# Patient Record
Sex: FEMALE | Race: WHITE | URBAN - METROPOLITAN AREA
[De-identification: names, ages, dates, MRNs, and addresses within clinical notes are randomized per-mention and may not be internally consistent; named-entity substitution may affect disease eponyms.]

---

## 2017-09-28 ENCOUNTER — TRANSFERRED RECORDS (OUTPATIENT)
Dept: HEALTH INFORMATION MANAGEMENT | Facility: CLINIC | Age: 67
End: 2017-09-28

## 2017-09-29 ENCOUNTER — ANESTHESIA EVENT (OUTPATIENT)
Dept: SURGERY | Facility: AMBULATORY SURGERY CENTER | Age: 67
End: 2017-09-29

## 2017-09-29 RX ORDER — CALCIUM POLYCARBOPHIL 625 MG 625 MG/1
2 TABLET ORAL DAILY
COMMUNITY

## 2017-09-29 RX ORDER — POLYETHYLENE GLYCOL 3350 17 G/17G
1 POWDER, FOR SOLUTION ORAL PRN
COMMUNITY
End: 2017-10-24

## 2017-09-29 RX ORDER — MULTIVITAMIN WITH IRON
1 TABLET ORAL DAILY
COMMUNITY

## 2017-10-02 ENCOUNTER — SURGERY (OUTPATIENT)
Age: 67
End: 2017-10-02

## 2017-10-02 ENCOUNTER — HOSPITAL ENCOUNTER (OUTPATIENT)
Facility: AMBULATORY SURGERY CENTER | Age: 67
End: 2017-10-02
Attending: ORTHOPAEDIC SURGERY

## 2017-10-02 ENCOUNTER — ANESTHESIA (OUTPATIENT)
Dept: SURGERY | Facility: AMBULATORY SURGERY CENTER | Age: 67
End: 2017-10-02

## 2017-10-02 VITALS
TEMPERATURE: 97.6 F | WEIGHT: 170 LBS | SYSTOLIC BLOOD PRESSURE: 109 MMHG | BODY MASS INDEX: 27.32 KG/M2 | HEIGHT: 66 IN | RESPIRATION RATE: 15 BRPM | OXYGEN SATURATION: 99 % | DIASTOLIC BLOOD PRESSURE: 65 MMHG

## 2017-10-02 RX ORDER — PROPOFOL 10 MG/ML
INJECTION, EMULSION INTRAVENOUS PRN
Status: DISCONTINUED | OUTPATIENT
Start: 2017-10-02 | End: 2017-10-02

## 2017-10-02 RX ORDER — NALOXONE HYDROCHLORIDE 0.4 MG/ML
.1-.4 INJECTION, SOLUTION INTRAMUSCULAR; INTRAVENOUS; SUBCUTANEOUS
Status: DISCONTINUED | OUTPATIENT
Start: 2017-10-02 | End: 2017-10-03 | Stop reason: HOSPADM

## 2017-10-02 RX ORDER — FENTANYL CITRATE 50 UG/ML
25-50 INJECTION, SOLUTION INTRAMUSCULAR; INTRAVENOUS
Status: DISCONTINUED | OUTPATIENT
Start: 2017-10-02 | End: 2017-10-02 | Stop reason: HOSPADM

## 2017-10-02 RX ORDER — DEXAMETHASONE SODIUM PHOSPHATE 4 MG/ML
INJECTION, SOLUTION INTRA-ARTICULAR; INTRALESIONAL; INTRAMUSCULAR; INTRAVENOUS; SOFT TISSUE PRN
Status: DISCONTINUED | OUTPATIENT
Start: 2017-10-02 | End: 2017-10-02

## 2017-10-02 RX ORDER — FENTANYL CITRATE 50 UG/ML
INJECTION, SOLUTION INTRAMUSCULAR; INTRAVENOUS PRN
Status: DISCONTINUED | OUTPATIENT
Start: 2017-10-02 | End: 2017-10-02

## 2017-10-02 RX ORDER — OXYCODONE HYDROCHLORIDE 5 MG/1
5 TABLET ORAL EVERY 4 HOURS PRN
Qty: 20 TABLET | Refills: 0 | Status: SHIPPED | OUTPATIENT
Start: 2017-10-02 | End: 2017-10-24

## 2017-10-02 RX ORDER — ACETAMINOPHEN 325 MG/1
975 TABLET ORAL ONCE
Status: COMPLETED | OUTPATIENT
Start: 2017-10-02 | End: 2017-10-02

## 2017-10-02 RX ORDER — ONDANSETRON 4 MG/1
4 TABLET, ORALLY DISINTEGRATING ORAL EVERY 30 MIN PRN
Status: DISCONTINUED | OUTPATIENT
Start: 2017-10-02 | End: 2017-10-03 | Stop reason: HOSPADM

## 2017-10-02 RX ORDER — GABAPENTIN 300 MG/1
300 CAPSULE ORAL ONCE
Status: COMPLETED | OUTPATIENT
Start: 2017-10-02 | End: 2017-10-02

## 2017-10-02 RX ORDER — PROPOFOL 10 MG/ML
INJECTION, EMULSION INTRAVENOUS CONTINUOUS PRN
Status: DISCONTINUED | OUTPATIENT
Start: 2017-10-02 | End: 2017-10-02

## 2017-10-02 RX ORDER — LIDOCAINE 40 MG/G
CREAM TOPICAL
Status: DISCONTINUED | OUTPATIENT
Start: 2017-10-02 | End: 2017-10-02 | Stop reason: HOSPADM

## 2017-10-02 RX ORDER — KETOROLAC TROMETHAMINE 30 MG/ML
INJECTION, SOLUTION INTRAMUSCULAR; INTRAVENOUS PRN
Status: DISCONTINUED | OUTPATIENT
Start: 2017-10-02 | End: 2017-10-02

## 2017-10-02 RX ORDER — SODIUM CHLORIDE, SODIUM LACTATE, POTASSIUM CHLORIDE, CALCIUM CHLORIDE 600; 310; 30; 20 MG/100ML; MG/100ML; MG/100ML; MG/100ML
INJECTION, SOLUTION INTRAVENOUS CONTINUOUS
Status: DISCONTINUED | OUTPATIENT
Start: 2017-10-02 | End: 2017-10-02 | Stop reason: HOSPADM

## 2017-10-02 RX ORDER — ONDANSETRON 2 MG/ML
INJECTION INTRAMUSCULAR; INTRAVENOUS PRN
Status: DISCONTINUED | OUTPATIENT
Start: 2017-10-02 | End: 2017-10-02

## 2017-10-02 RX ORDER — SODIUM CHLORIDE, SODIUM LACTATE, POTASSIUM CHLORIDE, CALCIUM CHLORIDE 600; 310; 30; 20 MG/100ML; MG/100ML; MG/100ML; MG/100ML
INJECTION, SOLUTION INTRAVENOUS CONTINUOUS
Status: DISCONTINUED | OUTPATIENT
Start: 2017-10-02 | End: 2017-10-03 | Stop reason: HOSPADM

## 2017-10-02 RX ORDER — CEFAZOLIN SODIUM 1 G/3ML
INJECTION, POWDER, FOR SOLUTION INTRAMUSCULAR; INTRAVENOUS PRN
Status: DISCONTINUED | OUTPATIENT
Start: 2017-10-02 | End: 2017-10-02

## 2017-10-02 RX ORDER — ONDANSETRON 2 MG/ML
4 INJECTION INTRAMUSCULAR; INTRAVENOUS EVERY 30 MIN PRN
Status: DISCONTINUED | OUTPATIENT
Start: 2017-10-02 | End: 2017-10-03 | Stop reason: HOSPADM

## 2017-10-02 RX ORDER — LIDOCAINE HYDROCHLORIDE 20 MG/ML
INJECTION, SOLUTION INFILTRATION; PERINEURAL PRN
Status: DISCONTINUED | OUTPATIENT
Start: 2017-10-02 | End: 2017-10-02

## 2017-10-02 RX ADMIN — PROPOFOL 100 MCG/KG/MIN: 10 INJECTION, EMULSION INTRAVENOUS at 07:26

## 2017-10-02 RX ADMIN — SODIUM CHLORIDE, SODIUM LACTATE, POTASSIUM CHLORIDE, CALCIUM CHLORIDE: 600; 310; 30; 20 INJECTION, SOLUTION INTRAVENOUS at 07:18

## 2017-10-02 RX ADMIN — GABAPENTIN 300 MG: 300 CAPSULE ORAL at 06:37

## 2017-10-02 RX ADMIN — LIDOCAINE HYDROCHLORIDE 80 MG: 20 INJECTION, SOLUTION INFILTRATION; PERINEURAL at 07:25

## 2017-10-02 RX ADMIN — FENTANYL CITRATE 50 MCG: 50 INJECTION, SOLUTION INTRAMUSCULAR; INTRAVENOUS at 07:19

## 2017-10-02 RX ADMIN — FENTANYL CITRATE 50 MCG: 50 INJECTION, SOLUTION INTRAMUSCULAR; INTRAVENOUS at 07:48

## 2017-10-02 RX ADMIN — DEXAMETHASONE SODIUM PHOSPHATE 4 MG: 4 INJECTION, SOLUTION INTRA-ARTICULAR; INTRALESIONAL; INTRAMUSCULAR; INTRAVENOUS; SOFT TISSUE at 07:18

## 2017-10-02 RX ADMIN — SODIUM CHLORIDE, SODIUM LACTATE, POTASSIUM CHLORIDE, CALCIUM CHLORIDE: 600; 310; 30; 20 INJECTION, SOLUTION INTRAVENOUS at 06:40

## 2017-10-02 RX ADMIN — ONDANSETRON 4 MG: 2 INJECTION INTRAMUSCULAR; INTRAVENOUS at 07:18

## 2017-10-02 RX ADMIN — PROPOFOL 200 MG: 10 INJECTION, EMULSION INTRAVENOUS at 07:25

## 2017-10-02 RX ADMIN — ACETAMINOPHEN 975 MG: 325 TABLET ORAL at 06:37

## 2017-10-02 RX ADMIN — KETOROLAC TROMETHAMINE 30 MG: 30 INJECTION, SOLUTION INTRAMUSCULAR; INTRAVENOUS at 07:55

## 2017-10-02 RX ADMIN — CEFAZOLIN SODIUM 2 G: 1 INJECTION, POWDER, FOR SOLUTION INTRAMUSCULAR; INTRAVENOUS at 07:53

## 2017-10-02 RX ADMIN — Medication 1800 ML: at 08:04

## 2017-10-02 NOTE — OP NOTE
"PREOPERATIVE DIAGNOSIS: right shoulder pain after with concern for infection      POSTOPERATIVE DIAGNOSIS: same      SURGICAL PROCEDURE: right shoulder arthroscopic biopsy for culture.       SURGEON: Lucas Lomeli MD     Assistant: None       IMPLANTS: None.       SPECIMEN:   1. Shoulder #1 (posterior superior intra-articular).   2. Shoulder #2 (anterosuperior intra-articular).   3. Shoulder #3 (anteroinferior intra-articular).   4. Shoulder #4 (posterior inferior and intra-articular).   5. Shoulder #5 (subacromial).       INDICATIONS: Patient has a history of painful shoulder after surgery and workup was indicated to rule out infection.    ANESTHESIA: Laryngeal mask anesthesia.       ESTIMATED BLOOD LOSS: Less than 25 mL.       DESCRIPTION OF PROCEDURE: The patient was positively identified in the preanesthesia care area, the surgical site was initialed by me and consent was reviewed. Patient was then taken to the operating theater and was placed in a well-padded beachchair position after surrendering to laryngeal mask anesthesia.       Prior to surgical initiation, a \"timeout\" was held in accordance with hospital policy. Verbal verification of delivery of prophylactic intravenous antibiotics was held until after biopsies were obtained and then given and was performed.       After establishment of a posterior viewing portal, an anteromedial portal was made under direct visualization through previous scar. Diagnostic arthroscopy was then possible with findings as follows: The pouch was synovitic, but devoid of loose bodies. There was significant bursitis. The rotator cuff was not visible consistent with a rotator cuff tear.       I took the specimens in the above-mentioned order. Each of the specimens was sent for aerobic and anaerobic with a Gram stain. The anaerobic labral (please keep 2 weeks to rule out P. acnes and speciate any bacteria that are cultured).       In the subacromial space there was no " evidence of bursal sided rotator cuff tearing. There is significant bursitis.       All instruments were removed. Closure was with 3-0 interrupted Monocryl sutures.      A sterile nonadherent dressing was applied. A sling was placed.       POSTOPERATIVE PLAN:   1. The patient will be discharged home today on oral analgesics. The arm can come out of the sling and begin using the arm whenever the patient is comfortable doing so.   2. Follow-up is in 3 weeks for evaluation. If the cultures are negative at that time, we will discuss surgical intervention for treatment.       If the cultures become positive before then, we will have patient evaluated by Infectious Disease with plan for spacer placement and staged reimplantation.           SJ MOORE MD

## 2017-10-02 NOTE — ANESTHESIA PREPROCEDURE EVALUATION
Anesthesia Evaluation     . Pt has had prior anesthetic.     No history of anesthetic complications          ROS/MED HX    ENT/Pulmonary:  - neg pulmonary ROS     Neurologic:  - neg neurologic ROS     Cardiovascular:         METS/Exercise Tolerance:  >4 METS   Hematologic:         Musculoskeletal:         GI/Hepatic:  - neg GI/hepatic ROS       Renal/Genitourinary:         Endo:     (+) thyroid problem .      Psychiatric:         Infectious Disease:         Malignancy:         Other:                     Physical Exam  Normal systems: dental    Airway   Mallampati: I  TM distance: >3 FB  Neck ROM: full    Dental     Cardiovascular   Rhythm and rate: regular      Pulmonary    breath sounds clear to auscultation                    Anesthesia Plan      History & Physical Review  History and physical reviewed and following examination; no interval change.    ASA Status:  2 .    NPO Status:  > 2 hours and > 8 hours    Plan for General and LMA with Intravenous induction. Maintenance will be TIVA.    PONV prophylaxis:  Ondansetron (or other 5HT-3) and Dexamethasone or Solumedrol       Postoperative Care  Postoperative pain management:  IV analgesics and Oral pain medications.      Consents  Anesthetic plan, risks, benefits and alternatives discussed with:  Patient..                          .

## 2017-10-02 NOTE — ANESTHESIA POSTPROCEDURE EVALUATION
Patient: Christiana Cadet    Procedure(s):  Right Arthroscopic biopsies for culture - Wound Class: II-Clean Contaminated    Diagnosis:Shoulder Pain Post Surgery  Diagnosis Additional Information: No value filed.    Anesthesia Type:  General    Note:  Anesthesia Post Evaluation    Patient location during evaluation: Phase 2  Patient participation: Able to fully participate in evaluation  Level of consciousness: awake  Pain management: adequate  Airway patency: patent  Cardiovascular status: acceptable  Respiratory status: acceptable  Hydration status: acceptable  PONV: none     Anesthetic complications: None          Last vitals:  Vitals:    10/02/17 0815 10/02/17 0825 10/02/17 0840   BP: 108/65 106/59 109/65   Resp: 14 16 15   Temp: 36.4  C (97.6  F) 36.4  C (97.5  F) 36.4  C (97.6  F)   SpO2:  97% 99%         Electronically Signed By: Pierre Holden MD  October 2, 2017  9:18 AM

## 2017-10-02 NOTE — IP AVS SNAPSHOT
[facility]    [address] [place] [id number]    Phone:  [phone]    Fax:  [phone]                                       After Visit Summary   10/2/2017    Christiana Cadet    MRN: [id number]           After Visit Summary Signature Page     I have received my discharge instructions, and my questions have been answered. I have discussed any challenges I see with this plan with the nurse or doctor.    ..........................................................................................................................................  Patient/Patient Representative Signature      ..........................................................................................................................................  Patient Representative Print Name and Relationship to Patient    ..................................................               ................................................  Date                                            Time    ..........................................................................................................................................  Reviewed by Signature/Title    ...................................................              ..............................................  Date                                                            Time

## 2017-10-02 NOTE — ANESTHESIA CARE TRANSFER NOTE
Patient: Christiana Cadet    Procedure(s):  Right Arthroscopic biopsies for culture - Wound Class: II-Clean Contaminated    Diagnosis: Shoulder Pain Post Surgery  Diagnosis Additional Information: No value filed.    Anesthesia Type:   General     Note:  Airway :Nasal Cannula  Patient transferred to:PACU  Comments: Patient awake and breathing. VSS. No complaints of pain or nausea. Report to Rn      Vitals: (Last set prior to Anesthesia Care Transfer)    CRNA VITALS  10/2/2017 0733 - 10/2/2017 0806      10/2/2017             Pulse: 68    SpO2: 98 %    Resp Rate (observed): (!)  5    Resp Rate (set): 10                Electronically Signed By: DEEPTI Zavala CRNA  October 2, 2017  8:06 AM

## 2017-10-02 NOTE — IP AVS SNAPSHOT
MRN:[id number]                      After Visit Summary   10/2/2017    Christiana Cadet    MRN: [id number]           Thank you!     Thank you for choosing [facility] for your care. Our goal is always to provide you with excellent care. Hearing back from our patients is one way we can continue to improve our services. Please take a few minutes to complete the written survey that you may receive in the mail after you visit with us. Thank you!        Patient Information     Date Of Birth          1950        About your hospital stay     You were admitted on:  October 2, 2017 [facility]    You were discharged on:  October 2, 2017       Who to Call     For medical emergencies, please call 911.  For non-urgent questions about your medical care, please call your primary care provider or clinic, None  For questions related to your surgery, please call your surgery clinic        Attending Provider     Provider Lucas Olson MD Orthopedics       Primary Care Provider    None Specified      After Care Instructions     Discharge Instructions       Dr. Lomeli's Discharge Instructions: Shoulder Arthroscopy     Activities: You will be provided with a sling. Wear the sling for comfort only. You can stop wearing the sling whenever you feel like doing so. Your surgeon will let you know if there are any additional recommendations. An ice pack may be used for pain relief, along with the prescription pain pills that were prescribed.    You will be taught Codman's (pendulum) exercises.  Please do these twice per day.    Anti-inflammatories/NSAIDs (Ibuprofen, Aleve, Motrin, etc.) are ok if needed for pain control.    Wound Care: You may remove your dressings in 4 days and shower. You may wet the wounds in the shower, but do not take baths. Redress the wounds with bandaids. Leave the steri strips (sticky tapes) in place.     Follow-up  "in 21 days. Call for an appointment. [phone] ([facility] or [facility]) or [phone] ([facility]).                  Further instructions from your care team       [facility]  Home Care Following Anesthesia  For 24 hours after surgery:  1. Get plenty of rest.  A responsible adult must stay with you for at least 24 hours after you leave the surgery center.  2. Do not drive or use heavy equipment.  If you have weakness or tingling, don't drive or use heavy equipment until this feeling goes away.   3. Do not drink alcohol.   4. Avoid strenuous or risky activities.  Ask for help when climbing stairs.  5. You may feel lightheaded.  IF so, sit for a few minutes before standing.  Have someone help you get up.   6. If you have nausea (feel sick to your stomach): Drink only clear liquids such as apple juice, ginger ale, broth or 7-Up.  Rest may also help.  Be sure to drink enough fluids.  Move to a regular diet as you feel able.   7. You may have a slight fever.  Call the doctor if your fever is over 100 F (37.7 C) (taken under the tongue) or lasts longer than 24 hours.  8. You may have a dry mouth, a sore throat, muscle aches or trouble sleeping. These should go away after 24 hours.  9. Do not make important or legal decisions.        Today you received a Marcaine or bupivacaine block to numb the nerves near your surgery site.  This is a block using local anesthetic or \"numbing\" medication injected around the nerves to anesthetize or \"numb\" the area supplied by those nerves.  This block is injected into the muscle layer near your surgical site.  The medication may numb the location where you had surgery for 6-18 hours, but may last up to 24 hours.  If your surgical site is an arm or leg you should be careful with your affected limb, since it is possible to injure your limb without being aware of it due to the numbing.  Until full feeling returns, you should guard against bumping or " hitting your limb, and avoid extreme hot or cold temperatures on the skin.  As the block wears off, the feeling will return as a tingling or prickly sensation near your surgical site.  You will experience more discomfort from your incision as the feeling returns.  You may want to take a pain pill (a narcotic or Tylenol if this was prescribed by your surgeon) when you start to experience mild pain before the pain beccomes more severe.  If your pain medications do not control your pain you should notifiy your surgeon.    Tips for taking pain medications  To get the best pain relief possible, remember these points:    Take pain medications as directed, before pain becomes severe.    Pain medication can upset your stomach: taking it with food may help.    Constipation is a common side effect of pain medication. Drink plenty of  fluids.    Eat foods high in fiber. Take a stool softener if recommended by your doctor or pharmacist.    Do not drink alcohol, drive or operate machinery while taking pain medications.    Ask about other ways to control pain, such as with heat, ice or relaxation.    Tylenol/Acetaminophen Consumption  To help encourage the safe use of acetaminophen, the makers of TYLENOL  have lowered the maximum daily dose for single-ingredient Extra Strength TYLENOL  (acetaminophen) products sold in the [place] from 8 pills per day (4,000 mg) to 6 pills per day (3,000 mg). The dosing interval has also changed from 2 pills every 4-6 hours to 2 pills every 6 hours.    If you feel your pain relief is insufficient, you may take Tylenol/Acetaminophen in addition to your narcotic pain medication.     Be careful not to exceed 3,000 mg of Tylenol/Acetaminophen in a 24 hour period from all sources.    If you are taking extra strength Tylenol/acetaminophen (500 mg), the maximum dose is 6 tablets in 24 hours.    If you are taking regular strength acetaminophen (325 mg), the maximum dose is 9 tablets in 24 hours.    Call a  "doctor for any of the followin. Signs of infection (fever, growing tenderness at the surgery site, a large amount of drainage or bleeding, severe pain, foul-smelling drainage, redness, swelling).  2. It has been over 8 to 10 hours since surgery and you are still not able to urinate (pass water).  3. Headache for over 24 hours.   Your doctor is:  Dr. Livan Lomeli, Orthopaedics: [phone]                Or dial [phone] and ask for the resident on call for:  Orthopaedics  For emergency care, call the:  [facility] Emergency Department: [phone] (TTY for hearing impaired: [phone])                Pending Results     No orders found from 2017 to 10/3/2017.            Admission Information     Date & Time Provider Department Dept. Phone    10/2/2017 Lucas Lomeli MD [facility] [phone]      Your Vitals Were     Blood Pressure Temperature Respirations Height Weight Pulse Oximetry    106/59 97.5  F (36.4  C) (Temporal) 16 1.676 m (5' 6\") 77.1 kg (170 lb) 97%    BMI (Body Mass Index)                   27.44 kg/m2           [employer] Information     [employer] is an electronic gateway that provides easy, online access to your medical records. With [employer], you can request a clinic appointment, read your test results, renew a prescription or communicate with your care team.     To sign up for [employer] visit the website at [email or web address] ([email or web address])   You will be asked to enter the access code listed below, as well as some personal information. Please follow the directions to create your username and password.     Your access code is: [email or web address]  Expires: 2017  8:30 AM     Your access code will  in 90 days. If you need help or a new code, please contact your [facility] or call [phone] for assistance.        Care EveryWhere ID     This is your Care EveryWhere ID. This could be used by other organizations to access " your [place] medical records  [id number]        Equal Access to Services     SEBASTIAN SANTANA : Hadii cecelia Durant, rocio walker, luis a flores. So [facility] [phone].    ATENCIÓN: Si habla español, tiene a zimmerman disposición servicios gratuitos de asistencia lingüística. Llame al [phone].    We comply with applicable federal civil rights laws and [place] laws. We do not discriminate on the basis of race, color, national origin, age, disability, sex, sexual orientation, or gender identity.               Review of your medicines      START taking        Dose / Directions    oxyCODONE 5 MG IR tablet   Commonly known as:  ROXICODONE        Dose:  5 mg   Take 1 tablet (5 mg) by mouth every 4 hours as needed for moderate to severe pain   Quantity:  20 tablet   Refills:  0         CONTINUE these medicines which have NOT CHANGED        Dose / Directions    ASPIR-81 PO        Take by mouth daily   Refills:  0       calcium-vitamin D 500-125 MG-UNIT Tabs        Refills:  0       DITROPAN PO        Dose:  5 mg   Take 5 mg by mouth 2 times daily   Refills:  0       EXCEDRIN MIGRAINE PO        Take by mouth as needed   Refills:  0       FIBERCON 625 MG tablet   Generic drug:  calcium polycarbophil        Dose:  2 tablet   Take 2 tablets by mouth daily   Refills:  0       magnesium 250 MG tablet        Dose:  1 tablet   Take 1 tablet by mouth daily   Refills:  0       ONE-A-DAY ESSENTIAL PO        Take by mouth daily   Refills:  0       polyethylene glycol Packet   Commonly known as:  MIRALAX/GLYCOLAX        Dose:  1 packet   Take 1 packet by mouth as needed for constipation   Refills:  0       SYNTHROID PO        Dose:  100 mcg   Take 100 mcg by mouth   Refills:  0            Where to get your medicines      Some of these will need a paper prescription and others can be bought over the counter. Ask your nurse if you have questions.     Bring a paper  prescription for each of these medications     oxyCODONE 5 MG IR tablet                Protect others around you: Learn how to safely use, store and throw away your medicines at www.disposemymeds.org.             Medication List: This is a list of all your medications and when to take them. Check marks below indicate your daily home schedule. Keep this list as a reference.      Medications           Morning Afternoon Evening Bedtime As Needed    ASPIR-81 PO   Take by mouth daily                                calcium-vitamin D 500-125 MG-UNIT Tabs                                DITROPAN PO   Take 5 mg by mouth 2 times daily                                EXCEDRIN MIGRAINE PO   Take by mouth as needed                                FIBERCON 625 MG tablet   Take 2 tablets by mouth daily   Generic drug:  calcium polycarbophil                                magnesium 250 MG tablet   Take 1 tablet by mouth daily                                ONE-A-DAY ESSENTIAL PO   Take by mouth daily                                oxyCODONE 5 MG IR tablet   Commonly known as:  ROXICODONE   Take 1 tablet (5 mg) by mouth every 4 hours as needed for moderate to severe pain                                polyethylene glycol Packet   Commonly known as:  MIRALAX/GLYCOLAX   Take 1 packet by mouth as needed for constipation                                SYNTHROID PO   Take 100 mcg by mouth                                          More Information        Pendulum Exercise for Use with Shoulder Repair Surgeries  Stretching exercises for your shoulder, such as the pendulum exercise, can improve flexibility, increase range of motion, and reduce pain. Your healthcare provider or physical therapist has recommended the pendulum exercise to help speed your recovery. Remember to breathe normally when you exercise and try to use smooth, fluid movements.    Doing the pendulum exercise    Follow any special instructions you were given. If you feel pain,  stop the exercise. If the pain continues after stopping, call your healthcare provider or physical therapist.    Start pendulum exercises with your affected arm as soon as directed by your healthcare provider:    Lean over with your good arm supported on a table or chair.    Relax the arm on the painful side, letting it hang straight down.    Slowly begin to swing the relaxed arm by moving your body. Move it in a [place], then reverse the direction. Next, move the arm backward and forward. Finally, move it side to side.    Let gravity gently sway your arm. Do not actively lift or move it with your shoulder muscles.    Do the exercise 3 times a day, for 5 to 10 minutes each time, or as directed by your healthcare provider. Change the direction of your movement after 1 minute of motion.  Home care    Wear your sling as directed.    Use pain medicine as directed by your healthcare provider.  Follow-up care  Make a follow-up appointment with your healthcare provider, or as advised.     When to call your healthcare provider  Call 911 right away if you have:    Chest pain    Shortness of breath  Otherwise, call your healthcare provider right away if you have:    Fever of 100.4 F  (38.0 C) or higher, or as advised    Shaking chills    Increasing shoulder pain    Pain that is not relieved by medicine    Pain or swelling in the arm on the side of your surgery    Numbness, tingling, or blue-gray color of your arm or fingers on the side of your surgery    Increased swelling or redness around the incision    Drainage or oozing around the incision   Date Last Reviewed: 7/1/2016 2000-2017 [employer]. [address] [place] [address], [place] [address]. All rights reserved. This information is not intended as a substitute for professional medical care. Always follow your healthcare professional's instructions.

## 2017-10-02 NOTE — DISCHARGE INSTRUCTIONS
"[facility]  Home Care Following Anesthesia  For 24 hours after surgery:  1. Get plenty of rest.  A responsible adult must stay with you for at least 24 hours after you leave the surgery center.  2. Do not drive or use heavy equipment.  If you have weakness or tingling, don't drive or use heavy equipment until this feeling goes away.   3. Do not drink alcohol.   4. Avoid strenuous or risky activities.  Ask for help when climbing stairs.  5. You may feel lightheaded.  IF so, sit for a few minutes before standing.  Have someone help you get up.   6. If you have nausea (feel sick to your stomach): Drink only clear liquids such as apple juice, ginger ale, broth or 7-Up.  Rest may also help.  Be sure to drink enough fluids.  Move to a regular diet as you feel able.   7. You may have a slight fever.  Call the doctor if your fever is over 100 F (37.7 C) (taken under the tongue) or lasts longer than 24 hours.  8. You may have a dry mouth, a sore throat, muscle aches or trouble sleeping. These should go away after 24 hours.  9. Do not make important or legal decisions.        Today you received a Marcaine or bupivacaine block to numb the nerves near your surgery site.  This is a block using local anesthetic or \"numbing\" medication injected around the nerves to anesthetize or \"numb\" the area supplied by those nerves.  This block is injected into the muscle layer near your surgical site.  The medication may numb the location where you had surgery for 6-18 hours, but may last up to 24 hours.  If your surgical site is an arm or leg you should be careful with your affected limb, since it is possible to injure your limb without being aware of it due to the numbing.  Until full feeling returns, you should guard against bumping or hitting your limb, and avoid extreme hot or cold temperatures on the skin.  As the block wears off, the feeling will return as a tingling or prickly sensation near your " surgical site.  You will experience more discomfort from your incision as the feeling returns.  You may want to take a pain pill (a narcotic or Tylenol if this was prescribed by your surgeon) when you start to experience mild pain before the pain beccomes more severe.  If your pain medications do not control your pain you should notifiy your surgeon.    Tips for taking pain medications  To get the best pain relief possible, remember these points:    Take pain medications as directed, before pain becomes severe.    Pain medication can upset your stomach: taking it with food may help.    Constipation is a common side effect of pain medication. Drink plenty of  fluids.    Eat foods high in fiber. Take a stool softener if recommended by your doctor or pharmacist.    Do not drink alcohol, drive or operate machinery while taking pain medications.    Ask about other ways to control pain, such as with heat, ice or relaxation.    Tylenol/Acetaminophen Consumption  To help encourage the safe use of acetaminophen, the makers of TYLENOL  have lowered the maximum daily dose for single-ingredient Extra Strength TYLENOL  (acetaminophen) products sold in the [place] from 8 pills per day (4,000 mg) to 6 pills per day (3,000 mg). The dosing interval has also changed from 2 pills every 4-6 hours to 2 pills every 6 hours.    If you feel your pain relief is insufficient, you may take Tylenol/Acetaminophen in addition to your narcotic pain medication.     Be careful not to exceed 3,000 mg of Tylenol/Acetaminophen in a 24 hour period from all sources.    If you are taking extra strength Tylenol/acetaminophen (500 mg), the maximum dose is 6 tablets in 24 hours.    If you are taking regular strength acetaminophen (325 mg), the maximum dose is 9 tablets in 24 hours.    Call a doctor for any of the followin. Signs of infection (fever, growing tenderness at the surgery site, a large amount of drainage or bleeding, severe pain, foul-smelling  drainage, redness, swelling).  2. It has been over 8 to 10 hours since surgery and you are still not able to urinate (pass water).  3. Headache for over 24 hours.   Your doctor is:  Dr. Livan Lomeli, Orthopaedics: [phone]                Or dial [phone] and ask for the resident on call for:  Orthopaedics  For emergency care, call the:  [facility] Emergency Department: [phone] (TTY for hearing impaired: [phone])

## 2017-10-07 LAB
BACTERIA SPEC CULT: NO GROWTH
SPECIMEN SOURCE: NORMAL

## 2017-10-16 LAB
BACTERIA SPEC CULT: NORMAL
Lab: NORMAL
Lab: NORMAL
SPECIMEN SOURCE: NORMAL

## 2017-10-25 ENCOUNTER — ANESTHESIA EVENT (OUTPATIENT)
Dept: SURGERY | Facility: CLINIC | Age: 67
DRG: 483 | End: 2017-10-25
Payer: COMMERCIAL

## 2017-10-25 ENCOUNTER — HOSPITAL ENCOUNTER (INPATIENT)
Facility: CLINIC | Age: 67
LOS: 2 days | Discharge: HOME | DRG: 483 | End: 2017-10-27
Attending: ORTHOPAEDIC SURGERY | Admitting: ORTHOPAEDIC SURGERY
Payer: COMMERCIAL

## 2017-10-25 ENCOUNTER — APPOINTMENT (OUTPATIENT)
Dept: GENERAL RADIOLOGY | Facility: CLINIC | Age: 67
DRG: 483 | End: 2017-10-25
Attending: ORTHOPAEDIC SURGERY
Payer: COMMERCIAL

## 2017-10-25 ENCOUNTER — ANESTHESIA (OUTPATIENT)
Dept: SURGERY | Facility: CLINIC | Age: 67
DRG: 483 | End: 2017-10-25
Payer: COMMERCIAL

## 2017-10-25 DIAGNOSIS — Z96.619 HISTORY OF TOTAL SHOULDER REPLACEMENT, UNSPECIFIED LATERALITY: ICD-10-CM

## 2017-10-25 DIAGNOSIS — Z96.611 HISTORY OF TOTAL REPLACEMENT OF RIGHT SHOULDER JOINT: Primary | ICD-10-CM

## 2017-10-25 LAB
ABO + RH BLD: NORMAL
ABO + RH BLD: NORMAL
BLD GP AB SCN SERPL QL: NORMAL
BLOOD BANK CMNT PATIENT-IMP: NORMAL
GLUCOSE SERPL-MCNC: 88 MG/DL (ref 70–99)
SPECIMEN EXP DATE BLD: NORMAL

## 2017-10-25 PROCEDURE — C1713 ANCHOR/SCREW BN/BN,TIS/BN: HCPCS | Performed by: ORTHOPAEDIC SURGERY

## 2017-10-25 PROCEDURE — 36000064 ZZH SURGERY LEVEL 4 EA 15 ADDTL MIN - UMMC: Performed by: ORTHOPAEDIC SURGERY

## 2017-10-25 PROCEDURE — 25000128 H RX IP 250 OP 636: Performed by: ANESTHESIOLOGY

## 2017-10-25 PROCEDURE — 0LS30ZZ REPOSITION RIGHT UPPER ARM TENDON, OPEN APPROACH: ICD-10-PCS | Performed by: ORTHOPAEDIC SURGERY

## 2017-10-25 PROCEDURE — 0RRJ00Z REPLACEMENT OF RIGHT SHOULDER JOINT WITH REVERSE BALL AND SOCKET SYNTHETIC SUBSTITUTE, OPEN APPROACH: ICD-10-PCS | Performed by: ORTHOPAEDIC SURGERY

## 2017-10-25 PROCEDURE — 40000171 ZZH STATISTIC PRE-PROCEDURE ASSESSMENT III: Performed by: ORTHOPAEDIC SURGERY

## 2017-10-25 PROCEDURE — 27210794 ZZH OR GENERAL SUPPLY STERILE: Performed by: ORTHOPAEDIC SURGERY

## 2017-10-25 PROCEDURE — C9290 INJ, BUPIVACAINE LIPOSOME: HCPCS | Performed by: ANESTHESIOLOGY

## 2017-10-25 PROCEDURE — 86900 BLOOD TYPING SEROLOGIC ABO: CPT | Performed by: ANESTHESIOLOGY

## 2017-10-25 PROCEDURE — 36415 COLL VENOUS BLD VENIPUNCTURE: CPT | Performed by: ANESTHESIOLOGY

## 2017-10-25 PROCEDURE — 99222 1ST HOSP IP/OBS MODERATE 55: CPT | Performed by: INTERNAL MEDICINE

## 2017-10-25 PROCEDURE — 99207 ZZC CONSULT E&M CHANGED TO INITIAL LEVEL: CPT | Performed by: INTERNAL MEDICINE

## 2017-10-25 PROCEDURE — 25000128 H RX IP 250 OP 636: Performed by: REGISTERED NURSE

## 2017-10-25 PROCEDURE — 36000062 ZZH SURGERY LEVEL 4 1ST 30 MIN - UMMC: Performed by: ORTHOPAEDIC SURGERY

## 2017-10-25 PROCEDURE — 25000125 ZZHC RX 250: Performed by: REGISTERED NURSE

## 2017-10-25 PROCEDURE — 12000001 ZZH R&B MED SURG/OB UMMC

## 2017-10-25 PROCEDURE — 86901 BLOOD TYPING SEROLOGIC RH(D): CPT | Performed by: ANESTHESIOLOGY

## 2017-10-25 PROCEDURE — 25000132 ZZH RX MED GY IP 250 OP 250 PS 637: Performed by: ANESTHESIOLOGY

## 2017-10-25 PROCEDURE — 37000009 ZZH ANESTHESIA TECHNICAL FEE, EACH ADDTL 15 MIN: Performed by: ORTHOPAEDIC SURGERY

## 2017-10-25 PROCEDURE — C1776 JOINT DEVICE (IMPLANTABLE): HCPCS | Performed by: ORTHOPAEDIC SURGERY

## 2017-10-25 PROCEDURE — 25000132 ZZH RX MED GY IP 250 OP 250 PS 637: Performed by: ORTHOPAEDIC SURGERY

## 2017-10-25 PROCEDURE — 37000008 ZZH ANESTHESIA TECHNICAL FEE, 1ST 30 MIN: Performed by: ORTHOPAEDIC SURGERY

## 2017-10-25 PROCEDURE — 25000125 ZZHC RX 250: Performed by: ORTHOPAEDIC SURGERY

## 2017-10-25 PROCEDURE — 71000014 ZZH RECOVERY PHASE 1 LEVEL 2 FIRST HR: Performed by: ORTHOPAEDIC SURGERY

## 2017-10-25 PROCEDURE — 86850 RBC ANTIBODY SCREEN: CPT | Performed by: ANESTHESIOLOGY

## 2017-10-25 PROCEDURE — 40000986 XR SHOULDER RT PORT G/E 2 VW: Mod: RT

## 2017-10-25 PROCEDURE — 25000566 ZZH SEVOFLURANE, EA 15 MIN: Performed by: ORTHOPAEDIC SURGERY

## 2017-10-25 PROCEDURE — 25800025 ZZH RX 258: Performed by: ORTHOPAEDIC SURGERY

## 2017-10-25 PROCEDURE — 82947 ASSAY GLUCOSE BLOOD QUANT: CPT | Performed by: ANESTHESIOLOGY

## 2017-10-25 PROCEDURE — 27110028 ZZH OR GENERAL SUPPLY NON-STERILE: Performed by: ORTHOPAEDIC SURGERY

## 2017-10-25 PROCEDURE — 25000125 ZZHC RX 250: Performed by: ANESTHESIOLOGY

## 2017-10-25 PROCEDURE — 25000128 H RX IP 250 OP 636: Performed by: ORTHOPAEDIC SURGERY

## 2017-10-25 DEVICE — IMP BASEPLATE MINI GLENOSPHERE BIOM REV SHLDR 25MM 010000589: Type: IMPLANTABLE DEVICE | Site: SHOULDER | Status: FUNCTIONAL

## 2017-10-25 DEVICE — IMP SCR LOCKING BIOM REV SHLDR 3.5 HEX 4.75X30MM 180553: Type: IMPLANTABLE DEVICE | Site: SHOULDER | Status: FUNCTIONAL

## 2017-10-25 DEVICE — IMP GLENOSPHERE BIOM REV SHLDR 41MM STD 115320: Type: IMPLANTABLE DEVICE | Site: SHOULDER | Status: FUNCTIONAL

## 2017-10-25 DEVICE — IMP HUMERAL BEARING BIOM REV SHLDR 44-41 STD XL-115366: Type: IMPLANTABLE DEVICE | Site: SHOULDER | Status: FUNCTIONAL

## 2017-10-25 DEVICE — IMP SCR LOCKING BIOM REV SHLDR 3.5 HEX 4.75X40MM 180555: Type: IMPLANTABLE DEVICE | Site: SHOULDER | Status: FUNCTIONAL

## 2017-10-25 DEVICE — IMP HUMERAL TRAY BIOM REV SHLDR 44MM STD 115370: Type: IMPLANTABLE DEVICE | Site: SHOULDER | Status: FUNCTIONAL

## 2017-10-25 DEVICE — IMP SCR LOCKING BIOM REV SHLDR 3.5 HEX 4.75X15MM 180550: Type: IMPLANTABLE DEVICE | Site: SHOULDER | Status: FUNCTIONAL

## 2017-10-25 DEVICE — IMP SCR LOCKING BIOM REV SHLDR 3.5 HEX 4.75X20MM 180551: Type: IMPLANTABLE DEVICE | Site: SHOULDER | Status: FUNCTIONAL

## 2017-10-25 RX ORDER — METOCLOPRAMIDE HYDROCHLORIDE 5 MG/ML
10 INJECTION INTRAMUSCULAR; INTRAVENOUS EVERY 6 HOURS PRN
Status: DISCONTINUED | OUTPATIENT
Start: 2017-10-25 | End: 2017-10-27 | Stop reason: HOSPADM

## 2017-10-25 RX ORDER — GABAPENTIN 100 MG/1
300 CAPSULE ORAL ONCE
Status: COMPLETED | OUTPATIENT
Start: 2017-10-25 | End: 2017-10-25

## 2017-10-25 RX ORDER — HYDRALAZINE HYDROCHLORIDE 20 MG/ML
2.5-5 INJECTION INTRAMUSCULAR; INTRAVENOUS EVERY 10 MIN PRN
Status: DISCONTINUED | OUTPATIENT
Start: 2017-10-25 | End: 2017-10-25 | Stop reason: HOSPADM

## 2017-10-25 RX ORDER — MORPHINE SULFATE 2 MG/ML
2-4 INJECTION, SOLUTION INTRAMUSCULAR; INTRAVENOUS
Status: DISCONTINUED | OUTPATIENT
Start: 2017-10-25 | End: 2017-10-27 | Stop reason: HOSPADM

## 2017-10-25 RX ORDER — FENTANYL CITRATE 50 UG/ML
INJECTION, SOLUTION INTRAMUSCULAR; INTRAVENOUS PRN
Status: DISCONTINUED | OUTPATIENT
Start: 2017-10-25 | End: 2017-10-25

## 2017-10-25 RX ORDER — NALOXONE HYDROCHLORIDE 0.4 MG/ML
.1-.4 INJECTION, SOLUTION INTRAMUSCULAR; INTRAVENOUS; SUBCUTANEOUS
Status: DISCONTINUED | OUTPATIENT
Start: 2017-10-25 | End: 2017-10-27 | Stop reason: HOSPADM

## 2017-10-25 RX ORDER — ACETAMINOPHEN 325 MG/1
975 TABLET ORAL ONCE
Status: COMPLETED | OUTPATIENT
Start: 2017-10-25 | End: 2017-10-25

## 2017-10-25 RX ORDER — SODIUM CHLORIDE, SODIUM LACTATE, POTASSIUM CHLORIDE, CALCIUM CHLORIDE 600; 310; 30; 20 MG/100ML; MG/100ML; MG/100ML; MG/100ML
INJECTION, SOLUTION INTRAVENOUS CONTINUOUS
Status: DISCONTINUED | OUTPATIENT
Start: 2017-10-25 | End: 2017-10-25 | Stop reason: HOSPADM

## 2017-10-25 RX ORDER — CEFAZOLIN SODIUM 2 G/100ML
2 INJECTION, SOLUTION INTRAVENOUS EVERY 8 HOURS
Status: COMPLETED | OUTPATIENT
Start: 2017-10-25 | End: 2017-10-26

## 2017-10-25 RX ORDER — LEVOTHYROXINE SODIUM 100 UG/1
100 TABLET ORAL DAILY
Status: DISCONTINUED | OUTPATIENT
Start: 2017-10-26 | End: 2017-10-27 | Stop reason: HOSPADM

## 2017-10-25 RX ORDER — ONDANSETRON 2 MG/ML
INJECTION INTRAMUSCULAR; INTRAVENOUS PRN
Status: DISCONTINUED | OUTPATIENT
Start: 2017-10-25 | End: 2017-10-25

## 2017-10-25 RX ORDER — NALOXONE HYDROCHLORIDE 0.4 MG/ML
.1-.4 INJECTION, SOLUTION INTRAMUSCULAR; INTRAVENOUS; SUBCUTANEOUS
Status: DISCONTINUED | OUTPATIENT
Start: 2017-10-25 | End: 2017-10-25 | Stop reason: HOSPADM

## 2017-10-25 RX ORDER — METOCLOPRAMIDE 10 MG/1
10 TABLET ORAL EVERY 6 HOURS PRN
Status: DISCONTINUED | OUTPATIENT
Start: 2017-10-25 | End: 2017-10-27 | Stop reason: HOSPADM

## 2017-10-25 RX ORDER — ACETAMINOPHEN 325 MG/1
975 TABLET ORAL EVERY 8 HOURS
Status: DISCONTINUED | OUTPATIENT
Start: 2017-10-25 | End: 2017-10-27 | Stop reason: HOSPADM

## 2017-10-25 RX ORDER — ONDANSETRON 4 MG/1
4 TABLET, ORALLY DISINTEGRATING ORAL EVERY 30 MIN PRN
Status: DISCONTINUED | OUTPATIENT
Start: 2017-10-25 | End: 2017-10-25 | Stop reason: HOSPADM

## 2017-10-25 RX ORDER — ONDANSETRON 2 MG/ML
4 INJECTION INTRAMUSCULAR; INTRAVENOUS EVERY 30 MIN PRN
Status: DISCONTINUED | OUTPATIENT
Start: 2017-10-25 | End: 2017-10-25 | Stop reason: HOSPADM

## 2017-10-25 RX ORDER — AMOXICILLIN 250 MG
1-2 CAPSULE ORAL 2 TIMES DAILY
Status: DISCONTINUED | OUTPATIENT
Start: 2017-10-25 | End: 2017-10-27 | Stop reason: HOSPADM

## 2017-10-25 RX ORDER — ONDANSETRON 2 MG/ML
4 INJECTION INTRAMUSCULAR; INTRAVENOUS EVERY 6 HOURS PRN
Status: DISCONTINUED | OUTPATIENT
Start: 2017-10-25 | End: 2017-10-27 | Stop reason: HOSPADM

## 2017-10-25 RX ORDER — LIDOCAINE 40 MG/G
CREAM TOPICAL
Status: DISCONTINUED | OUTPATIENT
Start: 2017-10-25 | End: 2017-10-25 | Stop reason: HOSPADM

## 2017-10-25 RX ORDER — LABETALOL HYDROCHLORIDE 5 MG/ML
10 INJECTION, SOLUTION INTRAVENOUS
Status: DISCONTINUED | OUTPATIENT
Start: 2017-10-25 | End: 2017-10-25 | Stop reason: HOSPADM

## 2017-10-25 RX ORDER — SODIUM CHLORIDE 9 MG/ML
INJECTION, SOLUTION INTRAVENOUS CONTINUOUS
Status: DISCONTINUED | OUTPATIENT
Start: 2017-10-25 | End: 2017-10-26

## 2017-10-25 RX ORDER — ACETAMINOPHEN 325 MG/1
975 TABLET ORAL ONCE
Status: DISCONTINUED | OUTPATIENT
Start: 2017-10-25 | End: 2017-10-25 | Stop reason: HOSPADM

## 2017-10-25 RX ORDER — OXYCODONE HYDROCHLORIDE 5 MG/1
5-10 TABLET ORAL
Status: DISCONTINUED | OUTPATIENT
Start: 2017-10-25 | End: 2017-10-26

## 2017-10-25 RX ORDER — PROCHLORPERAZINE MALEATE 5 MG
5-10 TABLET ORAL EVERY 6 HOURS PRN
Status: DISCONTINUED | OUTPATIENT
Start: 2017-10-25 | End: 2017-10-27 | Stop reason: HOSPADM

## 2017-10-25 RX ORDER — OXYBUTYNIN CHLORIDE 5 MG/1
5 TABLET ORAL
Status: DISCONTINUED | OUTPATIENT
Start: 2017-10-25 | End: 2017-10-27 | Stop reason: HOSPADM

## 2017-10-25 RX ORDER — OXYBUTYNIN CHLORIDE 5 MG/1
5 TABLET ORAL 2 TIMES DAILY
Status: DISCONTINUED | OUTPATIENT
Start: 2017-10-25 | End: 2017-10-25

## 2017-10-25 RX ORDER — FENTANYL CITRATE 50 UG/ML
25-50 INJECTION, SOLUTION INTRAMUSCULAR; INTRAVENOUS
Status: DISCONTINUED | OUTPATIENT
Start: 2017-10-25 | End: 2017-10-25 | Stop reason: HOSPADM

## 2017-10-25 RX ORDER — EPHEDRINE SULFATE 50 MG/ML
INJECTION, SOLUTION INTRAMUSCULAR; INTRAVENOUS; SUBCUTANEOUS PRN
Status: DISCONTINUED | OUTPATIENT
Start: 2017-10-25 | End: 2017-10-25

## 2017-10-25 RX ORDER — CEFAZOLIN SODIUM 1 G/3ML
1 INJECTION, POWDER, FOR SOLUTION INTRAMUSCULAR; INTRAVENOUS SEE ADMIN INSTRUCTIONS
Status: DISCONTINUED | OUTPATIENT
Start: 2017-10-25 | End: 2017-10-25 | Stop reason: HOSPADM

## 2017-10-25 RX ORDER — BUPIVACAINE HYDROCHLORIDE AND EPINEPHRINE 2.5; 5 MG/ML; UG/ML
INJECTION, SOLUTION INFILTRATION; PERINEURAL PRN
Status: DISCONTINUED | OUTPATIENT
Start: 2017-10-25 | End: 2017-10-25

## 2017-10-25 RX ORDER — LIDOCAINE 40 MG/G
CREAM TOPICAL
Status: DISCONTINUED | OUTPATIENT
Start: 2017-10-25 | End: 2017-10-27 | Stop reason: HOSPADM

## 2017-10-25 RX ORDER — HYDROXYZINE HYDROCHLORIDE 25 MG/1
25 TABLET, FILM COATED ORAL EVERY 6 HOURS PRN
Status: DISCONTINUED | OUTPATIENT
Start: 2017-10-25 | End: 2017-10-27 | Stop reason: HOSPADM

## 2017-10-25 RX ORDER — FLUMAZENIL 0.1 MG/ML
0.2 INJECTION, SOLUTION INTRAVENOUS
Status: DISCONTINUED | OUTPATIENT
Start: 2017-10-25 | End: 2017-10-25 | Stop reason: HOSPADM

## 2017-10-25 RX ORDER — LIDOCAINE HYDROCHLORIDE 20 MG/ML
INJECTION, SOLUTION INFILTRATION; PERINEURAL PRN
Status: DISCONTINUED | OUTPATIENT
Start: 2017-10-25 | End: 2017-10-25

## 2017-10-25 RX ORDER — DEXAMETHASONE SODIUM PHOSPHATE 4 MG/ML
INJECTION, SOLUTION INTRA-ARTICULAR; INTRALESIONAL; INTRAMUSCULAR; INTRAVENOUS; SOFT TISSUE PRN
Status: DISCONTINUED | OUTPATIENT
Start: 2017-10-25 | End: 2017-10-25

## 2017-10-25 RX ORDER — PROPOFOL 10 MG/ML
INJECTION, EMULSION INTRAVENOUS PRN
Status: DISCONTINUED | OUTPATIENT
Start: 2017-10-25 | End: 2017-10-25

## 2017-10-25 RX ORDER — ONDANSETRON 4 MG/1
4 TABLET, ORALLY DISINTEGRATING ORAL EVERY 6 HOURS PRN
Status: DISCONTINUED | OUTPATIENT
Start: 2017-10-25 | End: 2017-10-27 | Stop reason: HOSPADM

## 2017-10-25 RX ORDER — CEFAZOLIN SODIUM 2 G/100ML
2 INJECTION, SOLUTION INTRAVENOUS
Status: DISCONTINUED | OUTPATIENT
Start: 2017-10-25 | End: 2017-10-25 | Stop reason: HOSPADM

## 2017-10-25 RX ORDER — ACETAMINOPHEN 325 MG/1
650 TABLET ORAL EVERY 4 HOURS PRN
Status: DISCONTINUED | OUTPATIENT
Start: 2017-10-28 | End: 2017-10-27 | Stop reason: HOSPADM

## 2017-10-25 RX ORDER — MULTIVIT,TX WITH IRON,MINERALS
250 TABLET, EXTENDED RELEASE ORAL DAILY
Status: DISCONTINUED | OUTPATIENT
Start: 2017-10-25 | End: 2017-10-27 | Stop reason: HOSPADM

## 2017-10-25 RX ORDER — MULTIPLE VITAMINS W/ MINERALS TAB 9MG-400MCG
1 TAB ORAL DAILY
Status: DISCONTINUED | OUTPATIENT
Start: 2017-10-26 | End: 2017-10-27 | Stop reason: HOSPADM

## 2017-10-25 RX ADMIN — FENTANYL CITRATE 50 MCG: 50 INJECTION, SOLUTION INTRAMUSCULAR; INTRAVENOUS at 08:44

## 2017-10-25 RX ADMIN — FENTANYL CITRATE 50 MCG: 50 INJECTION, SOLUTION INTRAMUSCULAR; INTRAVENOUS at 09:19

## 2017-10-25 RX ADMIN — Medication 5 MG: at 10:09

## 2017-10-25 RX ADMIN — Medication 100 MG: at 09:19

## 2017-10-25 RX ADMIN — CEFAZOLIN SODIUM 2 G: 2 INJECTION, SOLUTION INTRAVENOUS at 13:57

## 2017-10-25 RX ADMIN — BUPIVACAINE 10 ML: 13.3 INJECTION, SUSPENSION, LIPOSOMAL INFILTRATION at 08:48

## 2017-10-25 RX ADMIN — Medication 5 MG: at 11:10

## 2017-10-25 RX ADMIN — GABAPENTIN 300 MG: 300 CAPSULE ORAL at 08:18

## 2017-10-25 RX ADMIN — ACETAMINOPHEN 975 MG: 325 TABLET, FILM COATED ORAL at 08:18

## 2017-10-25 RX ADMIN — LIDOCAINE HYDROCHLORIDE 100 MG: 20 INJECTION, SOLUTION INFILTRATION; PERINEURAL at 09:19

## 2017-10-25 RX ADMIN — SODIUM CHLORIDE, POTASSIUM CHLORIDE, SODIUM LACTATE AND CALCIUM CHLORIDE: 600; 310; 30; 20 INJECTION, SOLUTION INTRAVENOUS at 10:15

## 2017-10-25 RX ADMIN — SODIUM CHLORIDE: 9 INJECTION, SOLUTION INTRAVENOUS at 13:58

## 2017-10-25 RX ADMIN — PHENYLEPHRINE HYDROCHLORIDE 100 MCG: 10 INJECTION, SOLUTION INTRAMUSCULAR; INTRAVENOUS; SUBCUTANEOUS at 09:45

## 2017-10-25 RX ADMIN — PROPOFOL 130 MG: 10 INJECTION, EMULSION INTRAVENOUS at 09:19

## 2017-10-25 RX ADMIN — DEXAMETHASONE SODIUM PHOSPHATE 4 MG: 4 INJECTION, SOLUTION INTRAMUSCULAR; INTRAVENOUS at 09:30

## 2017-10-25 RX ADMIN — PHENYLEPHRINE HYDROCHLORIDE 200 MCG: 10 INJECTION, SOLUTION INTRAMUSCULAR; INTRAVENOUS; SUBCUTANEOUS at 09:32

## 2017-10-25 RX ADMIN — SODIUM CHLORIDE, POTASSIUM CHLORIDE, SODIUM LACTATE AND CALCIUM CHLORIDE: 600; 310; 30; 20 INJECTION, SOLUTION INTRAVENOUS at 09:14

## 2017-10-25 RX ADMIN — MIDAZOLAM HYDROCHLORIDE 1 MG: 1 INJECTION, SOLUTION INTRAMUSCULAR; INTRAVENOUS at 08:44

## 2017-10-25 RX ADMIN — PHENYLEPHRINE HYDROCHLORIDE 0.25 MCG/KG/MIN: 10 INJECTION, SOLUTION INTRAMUSCULAR; INTRAVENOUS; SUBCUTANEOUS at 09:54

## 2017-10-25 RX ADMIN — SENNOSIDES AND DOCUSATE SODIUM 1 TABLET: 8.6; 5 TABLET ORAL at 20:08

## 2017-10-25 RX ADMIN — BUPIVACAINE HYDROCHLORIDE AND EPINEPHRINE BITARTRATE 10 ML: 2.5; .005 INJECTION, SOLUTION INFILTRATION; PERINEURAL at 08:48

## 2017-10-25 RX ADMIN — PHENYLEPHRINE HYDROCHLORIDE 200 MCG: 10 INJECTION, SOLUTION INTRAMUSCULAR; INTRAVENOUS; SUBCUTANEOUS at 09:26

## 2017-10-25 RX ADMIN — ONDANSETRON 4 MG: 2 INJECTION INTRAMUSCULAR; INTRAVENOUS at 11:11

## 2017-10-25 RX ADMIN — Medication 5 MG: at 10:21

## 2017-10-25 RX ADMIN — ACETAMINOPHEN 975 MG: 325 TABLET, FILM COATED ORAL at 16:08

## 2017-10-25 RX ADMIN — PHENYLEPHRINE HYDROCHLORIDE 150 MCG: 10 INJECTION, SOLUTION INTRAMUSCULAR; INTRAVENOUS; SUBCUTANEOUS at 09:51

## 2017-10-25 RX ADMIN — PHENYLEPHRINE HYDROCHLORIDE 200 MCG: 10 INJECTION, SOLUTION INTRAMUSCULAR; INTRAVENOUS; SUBCUTANEOUS at 09:34

## 2017-10-25 NOTE — PROGRESS NOTES
S. Patient was seen today, at [facility] preop for measurements/delivery of an Ultrasling IV for right shoulder as ordered by Dr. Lomeli.    O/goal. To reduce motion and help with post-op support    A. At this time, I have provided patient with a size medium Don Jaky Ultrasling IV. Straps were trimmed and adjusted to achieve a custom fit for the patient. Brace was then left at the surgery control desk for physicians to place on the patient in the OR     P. Patient/staffing have been instructed to contact our facility with any future questions and/or concerns.    Flip Ultrasling IV Instructions

## 2017-10-25 NOTE — OR NURSING
PACU to Inpatient Nursing Handoff    Patient Christiana Cadet is a 66 year old female who speaks English.   Procedure Procedure(s):  Right Reverse Total Shoulder Arthroplasty   (choice anes)  - Wound Class: I-Clean   Surgeon(s) Primary: Lucas Lomeli MD  Assisting: Sinai Gilmore PA-C  Resident - Assisting: Schirmers, Joseph D     Allergies   Allergen Reactions     Hydrocodone      Hydromorphone      Lasix [Furosemide]      Lorazepam      Meperidine      Simvastatin        Isolation  [unfilled]    Past Medical History   has a past medical history of Arthritis and Thyroid disease.    Anesthesia Other   Dermatome Level     Preop Meds Acetaminophen 975mg, gabapentin 300mg at 0818   Nerve block Interscalene.  Location:right. Med:Exparel (liposomal bupivacaine). Time given: 0900   Intraop Meds fentanyl (Sublimaze):  150 mcg total, zofran 4mg, decadron 4mg   Local Meds No   Antibiotics Not applicable     Pain Patient Currently in Pain: denies  Comfort: negligible pain  Pain Control: fully effective   PACU meds  Not applicable   PCA / epidural No   Capnography  yes   Telemetry ECG Rhythm: Sinus rhythm      Labs Glucose Lab Results   Component Value Date    GLC 88 10/25/2017       Hgb No results found for: HGB    INR No results found for: INR   PACU Imaging Completed     Wound/Incision Incision/Surgical Site 10/02/17 Right Shoulder (Active)   Incision Assessment UTV 10/25/2017 11:45 AM   Janis-Incision Assessment UTV 10/2/2017  7:56 AM   Closure Adhesive strip(s);Approximated;Sutures 10/25/2017 11:09 AM   Incision Drainage Amount None 10/25/2017 12:15 PM   Drainage Description Serosanguinous 10/25/2017 11:09 AM   Dressing Intervention Clean, dry, intact 10/25/2017 12:15 PM   Number of days:23      CMS Peripheral Neurovascular WDL:  WDL except (10/25/17 1215)  All Extremities Temperature: warm (10/25/17 0730)  All Extremities Color: no discoloration (10/25/17 0730)  All Extremities Sensation: no tingling;no  numbness (10/25/17 0730)  RUE Temperature: warm (10/25/17 1215)  RUE Color: no discoloration (10/25/17 1215)  RUE Sensation: numbness present;tingling present (10/25/17 1215)   Equipment Shoulder sling, ice, cpqap   Other LDA       IV Access Peripheral IV Left Hand (Active)   Site Assessment [facility] 10/25/2017 12:15 PM   Line Status Infusing 10/25/2017 12:15 PM   Phlebitis Scale 0-->no symptoms 10/25/2017 12:15 PM   Infiltration Scale 0 10/25/2017 12:15 PM   Number of days:      Blood Products Not applicable EBL surg log 300   Intake/Output Date 10/25/17 0700 - 10/26/17 0659   Shift 5960-8899 9392-4262 3148-2537 24 Hour Total   I  N  T  A  K  E   I.V. 1700   1700    Shift Total  (mL/kg) 1700  (21.04)   1700  (21.04)   O  U  T  P  U  T   Urine 675   675    Blood 300   300    Shift Total  (mL/kg) 975  (12.07)   975  (12.07)   Weight (kg) 80.8 80.8 80.8 80.8        Drains / Brooks Closed/Suction Drain 1 Right Shoulder Accordion 10 Irish (Active)   Site Description [facility] 10/25/2017 11:45 AM   Dressing Status Normal: Clean, Dry & Intact 10/25/2017 12:15 PM   Drainage Appearance Bloody/Bright Red 10/25/2017 12:15 PM   Status Other (Comment) 10/25/2017 12:15 PM   Number of days:0       Urethral Catheter Latex;Straight-tip 16 fr (Active)   Collection Container Standard 10/25/2017 12:15 PM   Securement Method Securing device (Describe) 10/25/2017 12:15 PM   Rationale for Continued Use Anesthesia 10/25/2017 12:15 PM   Number of days:0      Time of void PreOp Void Prior to Procedure: 0825 (10/25/17 0836)    PostOp  brooks   Bladder Scan     PO    tolerating sips     Vitals    B/P: 97/57  T: 98.1  F (36.7  C)    Temp src: Axillary  P:  Pulse: 71 (10/25/17 0712)    Heart Rate: 82 (10/25/17 1215)     R: 10  O2:  SpO2: 99 %    O2 Device: Nasal cannula (10/25/17 1215)    Oxygen Delivery: 2 LPM (10/25/17 1215)         Family/support present no   Patient belongings Patient Belongings: cell  phone/electronics;clothing;glasses;shoes;purse  Disposition of Belongings: Other (see comment) (labeled and secured)   Patient transported on bed   DC meds/scripts (obs/outpt) Not applicable     Special needs/considerations None   Tasks needing completion None       Brittanie Larson, RN  ASCOM [id number]

## 2017-10-25 NOTE — OR NURSING
Denies pain or nausea. Xray done and Dr Lomeli called and said it looked good. MDA DR Wilson said patient was okay to go to room and she will do a note.

## 2017-10-25 NOTE — ANESTHESIA CARE TRANSFER NOTE
Patient: Christiana Cadet    Procedure(s):  Right Reverse Total Shoulder Arthroplasty   (choice anes)  - Wound Class: I-Clean    Diagnosis: Arthritis Right Shoulder   Diagnosis Additional Information: No value filed.    Anesthesia Type:   General, ETT, Periph. Nerve Block for postop pain     Note:  Airway :Face Mask  Patient transferred to:PACU  Handoff Report: Identifed the Patient, Identified the Reponsible Provider, Reviewed the pertinent medical history, Discussed the surgical course, Reviewed Intra-OP anesthesia mangement and issues during anesthesia, Set expectations for post-procedure period and Allowed opportunity for questions and acknowledgement of understanding      Vitals: (Last set prior to Anesthesia Care Transfer)    CRNA VITALS  10/25/2017 1059 - 10/25/2017 1130      10/25/2017             Pulse: 101    SpO2: 100 %                Electronically Signed By: DEEPTI Ronquillo CRNA  October 25, 2017  11:30 AM

## 2017-10-25 NOTE — PROGRESS NOTES
Progress Note  Christiana Cadet  : 1950       Assessment and Plan:    Christiana Cadet is a 66 year old  female status-post right reverse TSA on 10/25 with Dr. Lomeli the following:     Ortho Primary  Activity: Up with assist. No shoulder ROM x 6 weeks, SI at all times save for hygiene.  Weight bearing status: NWB RUE x 6 weeks  Antibiotics/Tetanus: Ancef x 24 hours.  Diet: Begin with clear fluids and progress diet as tolerated.   DVT prophylaxis: mechanical while in the hospital  Bracing/Splinting: SI to be kept clean, dry, and intact until follow-up.   Elevation: Elevate RUE on pillows to keep above the level of the heart as much as possible.   Wound Care: Dressing change at bedside by Ortho on POD #5-7  Drains: Document output per shift, discontinue when <30cc/shift.   Pain management: transition from IV to orals as tolerated.    Physical Therapy/Occupational Therapy: transfers, ADL's.   Consults: PT, OT. medicine, appreciate assistance in caring for this patient.   Follow-up: Clinic with Dr. Lomeli in 2 weeks (on or around ) for wound check with no x-rays needed.      Disposition: Pending progress with therapies, pain control on orals, and medical stability, anticipate discharge to home POD #2. [sister will be giving her a ride to [place] where she lives alone].             Interval History:     No acute events overnight. Pain is adequately controlled on current regimen. Denies n/v, SOB, cp, dyspnea. Patient ambulatory about the rdz this AM.  She would like to d/c tomorrow is possible.           Physical Exam:     Physical Exam   Temp: 98.2  F (36.8  C) Temp src: Oral BP: 97/48 Pulse: 71 Heart Rate: 76 Resp: 13 SpO2: 99 % O2 Device: Nasal cannula Oxygen Delivery: 3 LPM  Temp:  [98.2  F (36.8  C)] 98.2  F (36.8  C)  Pulse:  [71] 71  Heart Rate:  [66-76] 76  Resp:  [11-21] 13  BP: ()/(48-65) 97/48  SpO2:  [99 %-100 %] 99 % 178 lbs 2.11 oz   Gen:                                               No acute distress. Alert.  Pulm:                                            Non-labored breathing ORA  Incision:                                        aquacel c/d/i  RUE:                                            SILT throughout ax/r/m/u distributions. Fires ain/pin/intrinsics. Sets deltoid. Capillary refill is brisk, ext are wwp. +radial pulse.      Drain: 275/50 last 2 shifts     Labs:  CBCNo lab results found in last 7 days.    J.D. Schirmers, MD   Orthopaedic Surgery Resident, PGY-5  Pager: [phone]    For questions about this patient, please contact me at my pager.

## 2017-10-25 NOTE — ANESTHESIA PROCEDURE NOTES
Peripheral Nerve Block Procedure Note    Staff:     Anesthesiologist:  JEFF LUND    Referred By:  SJ MOORE  Location: Pre-op  Procedure Start/Stop TImes:      10/25/2017 8:40 AM     10/25/2017 8:48 AM    patient identified, IV checked, site marked, risks and benefits discussed, informed consent, monitors and equipment checked, pre-op evaluation, at physician/surgeon's request and post-op pain management      Correct Patient: Yes      Correct Position: Yes      Correct Site: Yes      Correct Procedure: Yes      Correct Laterality:  Yes    Site Marked:  Yes  Procedure details:     Procedure:  Interscalene    ASA:  2    Diagnosis:  Shoulder surgery    Laterality:  Right    Position:  Supine    Sterile Prep: chloraprep, mask and sterile gloves      Local skin infiltration:  2% lidocaine    amount (mL):  2    Needle:  Short bevel and insulated    Needle gauge:  21    Needle length (inches):  4    Ultrasound: Yes      Ultrasound used to identify targeted nerve, plexus, or vascular structure and placed a needle adjacent to it      Permanent Image entered into patiient's record      Abnormal pain on injection: No      Blood Aspirated: No      Paresthesias:  No    Bleeding at site: No      Bolus via:  Needle    Infusion Method:  Single Shot    Complications:  None  Assessment/Narrative:     Injection made incrementally with aspirations every (mL):  5     Informed consent obtained.  All risks and benefits of the nerve block discussed with the patient.  All questions answered and all parties agreed with the plan.   Discussed with Patient Off-Label use of Liposomal Bupivacaine (Exparel) for Nerve Block.    Relevant risks & benefits were discussed with patient.    All questions were answered and there was agreement to proceed.    Patient signed Off-Label Use of Exparel Consent Form.

## 2017-10-25 NOTE — IP AVS SNAPSHOT
[facility]    [address]    [place] [address]    Phone:  [phone]                                       After Visit Summary   10/25/2017    Christiana Cadet    MRN: [id number]           After Visit Summary Signature Page     I have received my discharge instructions, and my questions have been answered. I have discussed any challenges I see with this plan with the nurse or doctor.    ..........................................................................................................................................  Patient/Patient Representative Signature      ..........................................................................................................................................  Patient Representative Print Name and Relationship to Patient    ..................................................               ................................................  Date                                            Time    ..........................................................................................................................................  Reviewed by Signature/Title    ...................................................              ..............................................  Date                                                            Time

## 2017-10-25 NOTE — ANESTHESIA PREPROCEDURE EVALUATION
Anesthesia Evaluation     . Pt has had prior anesthetic. Type: General    No history of anesthetic complications          ROS/MED HX    ENT/Pulmonary:  - neg pulmonary ROS     Neurologic:  - neg neurologic ROS     Cardiovascular:     (+) Dyslipidemia, ----. : . . . :. .       METS/Exercise Tolerance:  >4 METS   Hematologic:  - neg hematologic  ROS       Musculoskeletal:  - neg musculoskeletal ROS       GI/Hepatic:  - neg GI/hepatic ROS       Renal/Genitourinary:  - ROS Renal section negative       Endo:     (+) thyroid problem .      Psychiatric:  - neg psychiatric ROS       Infectious Disease:  - neg infectious disease ROS       Malignancy:      - no malignancy   Other:    - neg other ROS                 Physical Exam  Normal systems: dental    Airway   Mallampati: I  TM distance: >3 FB  Neck ROM: full    Dental     Cardiovascular   Rhythm and rate: regular and normal      Pulmonary    breath sounds clear to auscultation        Procedure: Procedure(s):  Right Reverse Total Shoulder Arthroplasty   (choice anes)  - Wound Class: I-Clean    HPI: Christiana Cadet is a 66 year old female who is presenting for above stated procedure.    PMHx/PSHx/ROS:  Past Medical History:   Diagnosis Date     Arthritis      Thyroid disease        Past Surgical History:   Procedure Laterality Date     ABDOMEN SURGERY      bowel obstruction surgery 2 times     APPENDECTOMY       ARTHROPLASTY KNEE Right      ARTHROSCOPY SHOULDER WITH BIOPSY(IES) Right 10/2/2017    Procedure: ARTHROSCOPY SHOULDER WITH BIOPSY(IES);  Right Arthroscopic biopsies for culture;  Surgeon: Lucas Lomeli MD;  Location: [facility] OR     HYSTERECTOMY       ROTATOR CUFF REPAIR RT/LT Bilateral      TONSILLECTOMY & ADENOIDECTOMY         ROS as stated above    Soc Hx:   Social History   Substance Use Topics     Smoking status: Never Smoker     Smokeless tobacco: Never Used     Alcohol use No       Allergies:   Allergies   Allergen Reactions     Hydrocodone       Hydromorphone      Lasix [Furosemide]      Lorazepam      Meperidine      Simvastatin        Meds:   Prescriptions Prior to Admission   Medication Sig Dispense Refill Last Dose     Levothyroxine Sodium (SYNTHROID PO) Take 100 mcg by mouth daily    10/25/2017 at 0400     Oxybutynin Chloride (DITROPAN PO) Take 5 mg by mouth 2 times daily   10/25/2017 at 0400     calcium polycarbophil (FIBERCON) 625 MG tablet Take 2 tablets by mouth daily   10/24/2017 at 1200     magnesium 250 MG tablet Take 1 tablet by mouth daily   10/24/2017 at 1200     calcium-vitamin D 500-125 MG-UNIT TABS Take by mouth daily    10/24/2017 at 1200     Multiple Vitamin (ONE-A-DAY ESSENTIAL PO) Take by mouth daily   10/24/2017 at 1200     Aspirin-Acetaminophen-Caffeine (EXCEDRIN MIGRAINE PO) Take by mouth as needed   10/19/2017     Aspirin (ASPIR-81 PO) Take by mouth daily   10/19/2017       No current outpatient prescriptions on file.       Physical Exam:  VS: Temp:  [36.8  C (98.2  F)] 36.8  C (98.2  F)  Pulse:  [71] 71  Heart Rate:  [66-68] 66  Resp:  [12-21] 21  BP: (102-108)/(59-62) 108/62  SpO2:  [99 %-100 %] 100 %   100%, Weight   Wt Readings from Last 2 Encounters:   10/25/17 80.8 kg (178 lb 2.1 oz)   10/02/17 77.1 kg (170 lb)       Labs:    BMP:  No results for input(s): NA, POTASSIUM, CHLORIDE, CO2, BUN, CR, GLC, PUJA in the last 05222 hours.  LFTs:   No results for input(s): PROTTOTAL, ALBUMIN, BILITOTAL, ALKPHOS, AST, ALT, BILIDIRECT in the last 64361 hours.  CBC:   No results for input(s): WBC, RBC, HGB, HCT, MCV, MCH, MCHC, RDW, PLT in the last 15244 hours.  Coags:  No results for input(s): INR, PTT, FIBR in the last 57446 hours.                Anesthesia Plan      History & Physical Review  History and physical reviewed and following examination; no interval change.    ASA Status:  2 .    NPO Status:  > 6 hours    Plan for General, ETT and Periph. Nerve Block for postop pain with Intravenous and Propofol induction. Maintenance will be  Balanced.    PONV prophylaxis:  Ondansetron (or other 5HT-3) and Dexamethasone or Solumedrol       Postoperative Care  Postoperative pain management:  Multi-modal analgesia and Peripheral nerve block (Single Shot).      Consents  Anesthetic plan, risks, benefits and alternatives discussed with:  Patient..        I have personally discussed the risks and benefits of anesthesia for this procedure with the patient.     Nik Wilson MD  Anesthesiology                  .

## 2017-10-25 NOTE — LETTER
Transition Communication Hand-off for Care Transitions to Next Level of Care Provider    Name: Christiana Cadet  MRN #: [id number]  Primary Care Provider: Rand French     Primary Clinic: [facility] [place] [address]    [facility] [address]     Reason for Hospitalization:  Arthritis Right Shoulder   H/O total shoulder replacement  Admit Date/Time: 10/25/2017  6:59 AM  Discharge Date: 10/27/2017  Payor Source: Payor: MEDICA / Plan: MEDICA CHOICE / Product Type: Indemnity /          Reason for Communication Hand-off Referral: Avoidable readmission within 30 days    Discharge Needs Assessment:  Needs       Most Recent Value    Equipment Currently Used at Home none    Transportation Available car        Follow-up specialty is recommended: Yes    Follow-up plan:  Future Appointments  Date Time Provider Department Center   10/27/2017 10:30 AM Erika Burger, OT [facility] [place]       Nathalia Mon RN, BSN  Care Coordinator,   Phone [phone]  Pager [phone]    AVS/Discharge Summary is the source of truth; this is a helpful guide for improved communication of patient story

## 2017-10-25 NOTE — OP NOTE
"DATE OF SURGERY:  10/25/2017       PREOPERATIVE DIAGNOSES:  Right shoulder rotator cuff tear arthropathy.      POSTOPERATIVE DIAGNOSIS:  Right shoulder rotator cuff tear arthropathy.      SURGICAL PROCEDURE:  Right shoulder reverse total shoulder arthroplasty.      SURGEON:  Lucas Lomeli MD      ASSISTANT:  Sinai Gilmore PA-C        The assistance of Sinai Gilmore was necessitated by the orthopedic complexity of the case, the need to hold retractors, and position the arm space.  No other level of surgical assistant would have provided adequate support for the patient's surgical best interest.      LEARNER:  Joseph Schirmers, MD, resident      IMPLANTS:   1.  Biomet Comprehensive size 25 mm baseplate.   2.  Biomet Comprehensive size 41 mm standard glenosphere.   3.  Biomet Comprehensive size 44 mm standard humeral tray with locking ring.   4.  Biomet Comprehensive size 44, 41 diameter of curvature polyethylene humeral bearing.   5.  Biomet Comprehensive size 11 x 55 mm press-fit humeral stem.      INDICATIONS:  Ms. Christiana Cadet is a very pleasant woman with history of pain and disability in her shoulder.  She had a previous rotator cuff repair that failed and went on to have progressive arthrosis.  After biopsies to rule out infection (which were all negative), she was here scheduled for reverse total shoulder arthroplasty.      ESTIMATED BLOOD LOSS:  300 mL.      DESCRIPTION OF PROCEDURE:  The patient was positively identified in the preanesthesia care area, surgical site was initialed by me and her consent was reviewed with her.  She was then taken to the operating theater, where she was placed in a well-padded beach chair position, prepped and draped in the usual sterile fashion for right upper extremity surgery.      Prior to surgical initiation, a \"timeout\" was held in accordance with hospital policy.  Verbal verification of delivery of prophylactic intravenous antibiotics was performed.      After " "establishment of a 12-cm anterior deltopectoral incision, I was able to identify the cephalic vein and allowed it to track medially.  Tributaries to the deltoid were ligated and coagulated.      I mobilized the subdeltoid space.  It was dense and adherently scarred.  A portion of the deltoid had been sewn to the humeral shaft.  This had to be delicately elevated.  There was significant bleeding from around the axillary nerve after I mobilized the subdeltoid space.  The axillary nerve was verified to be in continuity using the \"tug test.\"  I did this numerous times in the course of the procedure including once just prior to deltopectoral closure verifying that the axillary nerve remained in continuity throughout.      Following this, I was able to bring the subscapularis down.  I tenodesed the biceps tendon to the pectoralis major tendon and amputated the residual biceps proximal to this.  The subscapularis was brought down in a subperiosteal fashion.  I did a circumferential subscapularis release affecting adequate subscapularis bounce.      I delivered the humerus into the wound.  Superior cuff was gone.  The posterior cuff was incompetent.  I reamed until an 11 mm reamer had acceptable cortical chatter, then broached it until an 11 fit perfectly.  I turned my attention to the glenoid.      I did glenoid exposure in the standard fashion.  The axillary nerve was really quite proximal and it was up against the undersurface of the humerus because of her proximal migration.  I mobilized this carefully protecting it behind my gloved finger and removing the inferior capsule.  This allowed excellent exposure of the glenoid.  Her glenoid was quite small and even a size 25 mm mini baseplate overhung at least a mm anteriorly and posteriorly.  I centered this with 15 degrees of inferior tilt, drilled and reamed.  I placed the circumferential the center screw followed by the peripheral screws in a locking fashion.  A 41 mm " glenosphere fit.  I copiously irrigated and placed the definitive implant into position using the C offset in a 6:00 location.  I then was able to do a provisional reduction with the above-mentioned implant trials and found 1 mm longitudinal traction.      I removed the humeral trial, placed sutures around the cut neck of the humerus, placed the definitive implant in position and reapproximated the subscapularis back to the cut neck of the humerus using simple sutures.  This affected similar motion profile after reduction.      A drain was placed proximally to the axillary nerve and the axillary nerve verified to be in continuity.      Ethibonds were used to marie the interval proximally and distally in case of need for later revision followed by 0 Vicryl, 2-0 Vicryl and 3-0 running subcuticular Monocryl.  Steri-Strips and a sterile nonadherent dressing were applied.  A sling was placed.      POSTOPERATIVE PLAN:   1.  The patient will be admitted to the Orthopedic Service for intravenous followed by oral pain medications.  She should have no active or passive range of motion at this time.     2.  At the 6-week marie, she will begin activities of daily living and have her sling discontinued.   3.  At 3 months, she will have radiographs and begin unrestricted 4-quadrant stretching, periscapular stabilization and strengthening.         SJ MOORE MD             D: 10/25/2017 11:29   T: 10/25/2017 12:02   MT: IAN      Name:     GISELLA DOBSON   MRN:      [id number]        Account:        [id number]   :      1950           Procedure Date: 10/25/2017      Document: [id number]

## 2017-10-25 NOTE — CONSULTS
[facility] Internal Medicine Consultation    Christiana Cadet MRN# [id number]   Age: 66 year old YOB: 1950   Date of Admission: 10/25/2017     Reason for consult:  Post Operative Co-Management        Requesting physician Lucas Lomeli MD       Level of consult: Consult, follow and place orders           Assessment and Plan:   Assessment:   66 yr old S/P reverse shoulder arthroplasty.    Plan:   S/P Rt reverse arthroplasty:  Management by primary team. Please see their notes for further details  Continue IV fluids until able to take oral diet. Given patient's advancement, it is likley that IV fluids can be stopped this evening   Pain control with Exparel block,  acetaminophen 975 mg every 8 hrs for 3 days,Oxycodone   5-10   mg every 3 hrs PRN and IV Morphine  2-4  mg q 3 hrs for breakthrough pain   DVT prophylaxis with  SCDs while in hospital,  PT/OT as per protocol   Incentive spirometry and aggressive bowel regimen  We will monitor for acute blood loss anemia.      Resume home dose of levothyroxine   Resume Oxybutynin after removal of brooks           Chief Complaint:   S/P Rt shoulder arthroplasty      History is obtained from the patient, pre op physical and perioperative notes    Christiana Riggins is a pleasant 66 year old female patient, who underwent the above procedure today. The procedure itself was uneventful with estimated blood loss at 300 mls  Patient does not have any documented IV antibiotics received intraoperatively  Post operatively she recovered well  She denies chest pain/ SOB   Denies fevers or chills  Denies nausea, vomiting or diarrhea  Denies headache  Does not have known cardio-respiratory problems at base line           Past Medical History:     Past Medical History:   Diagnosis Date     Arthritis      Thyroid disease              Past Surgical History:     Past Surgical History:   Procedure Laterality Date     ABDOMEN SURGERY      bowel obstruction surgery 2 times     APPENDECTOMY        ARTHROPLASTY KNEE Right      ARTHROSCOPY SHOULDER WITH BIOPSY(IES) Right 10/2/2017    Procedure: ARTHROSCOPY SHOULDER WITH BIOPSY(IES);  Right Arthroscopic biopsies for culture;  Surgeon: Lucas Lomeli MD;  Location: [facility] OR     HYSTERECTOMY       ROTATOR CUFF REPAIR RT/LT Bilateral      TONSILLECTOMY & ADENOIDECTOMY               Social History:     Social History   Substance Use Topics     Smoking status: Never Smoker     Smokeless tobacco: Never Used     Alcohol use No             Family History:   History reviewed. No pertinent family history.          Immunizations:     There is no immunization history on file for this patient.          Allergies:     Allergies   Allergen Reactions     Hydrocodone      Hydromorphone      Lasix [Furosemide]      Lorazepam      Meperidine      Simvastatin              Medications:     Prescriptions Prior to Admission   Medication Sig Dispense Refill Last Dose     Levothyroxine Sodium (SYNTHROID PO) Take 100 mcg by mouth daily    10/25/2017 at 0400     Oxybutynin Chloride (DITROPAN PO) Take 5 mg by mouth 2 times daily   10/25/2017 at 0400     calcium polycarbophil (FIBERCON) 625 MG tablet Take 2 tablets by mouth daily   10/24/2017 at 1200     magnesium 250 MG tablet Take 1 tablet by mouth daily   10/24/2017 at 1200     calcium-vitamin D 500-125 MG-UNIT TABS Take by mouth daily    10/24/2017 at 1200     Multiple Vitamin (ONE-A-DAY ESSENTIAL PO) Take by mouth daily   10/24/2017 at 1200     Aspirin-Acetaminophen-Caffeine (EXCEDRIN MIGRAINE PO) Take by mouth as needed   10/19/2017     Aspirin (ASPIR-81 PO) Take by mouth daily   10/19/2017             Review of Systems:   A comprehensive review of systems was performed and found to be negative except as described in this note         Physical Exam:   Vitals were reviewed    Patient Vitals for the past 8 hrs:   BP Temp Temp src Pulse Heart Rate Resp SpO2 Height Weight   10/25/17 1300 97/56 - - - 76 10 98 % - -   10/25/17  "1245 102/60 98.1  F (36.7  C) Axillary - 75 13 98 % - -   10/25/17 1230 105/59 - - - 80 10 98 % - -   10/25/17 1215 97/57 98.1  F (36.7  C) Axillary - 82 10 99 % - -   10/25/17 1200 94/66 - - - 83 11 96 % - -   10/25/17 1145 106/62 - - - 87 9 94 % - -   10/25/17 1136 110/64 98.4  F (36.9  C) - - 79 12 100 % - -   10/25/17 0900 97/48 - - - 76 13 99 % - -   10/25/17 0845 110/65 - - - 68 11 100 % - -   10/25/17 0840 101/61 - - - 67 19 100 % - -   10/25/17 0835 108/62 - - - 66 21 100 % - -   10/25/17 0830 102/59 - - - 68 12 100 % - -   10/25/17 0712 108/62 98.2  F (36.8  C) Oral 71 - 16 99 % 1.692 m (5' 6.6\") 80.8 kg (178 lb 2.1 oz)     Constitutional:   awake, alert, cooperative, no apparent distress, and appears stated age     Eyes:   Lids and lashes normal, pupils equal, round and reactive to light, extra ocular muscles intact, sclera clear, conjunctiva normal     ENT:   Normocephalic, without obvious abnormality, atraumatic, sinuses nontender on palpation, external ears without lesions, oral pharynx with moist mucous membranes.     Neck:   Supple, symmetrical, trachea midline, no adenopathy, thyroid symmetric, not enlarged and no tenderness, skin normal     Lungs:   No increased work of breathing, good air exchange, clear to auscultation bilaterally, no crackles or wheezing     Cardiovascular:   Normal apical impulse, regular rate and rhythm, normal S1 and S2, no S3 or S4, and no murmur noted     Abdomen:   No scars, normal bowel sounds, soft, non-distended, non-tender, no masses palpated, no hepatosplenomegally     Musculoskeletal:   Right shoulder with dressings. Drain present.   Tingling and numbness from regional block      Neurologic:   Awake, alert, oriented to name, place and time.  Cranial nerves II-XII are grossly intact.       Skin:   no bruising or bleeding                                 Data:   BMP  Recent Labs  Lab 10/25/17  0802   GLC 88     CBCNo lab results found in last 7 days.  INRNo lab results " found in last 7 days.  LFTsNo lab results found in last 7 days.   PANCNo lab results found in last 7 days.        .

## 2017-10-25 NOTE — BRIEF OP NOTE
Brief Operative Note    Pre-operative diagnosis: right shoulder rotator cuff tear Arthritis     Post-operative diagnosis:   Same     Procedure:   Right Reverse Total Shoulder Arthroplasty     Surgeon:    Assistant:    MD Sinai Rose PACoyC       Anesthesia: ISB with General     Estimated blood loss: 300 cc         Drains: Hemovac     Specimens: Humeral Head --> Discarded       Findings: Arthritis     Complications: None     Condition: Stable     Weight bearing status: Sling at all times.    PT/OT per dictated operative report.           Comments: Dictated by Yani

## 2017-10-25 NOTE — ANESTHESIA POSTPROCEDURE EVALUATION
Patient: Christiana Cadet    Procedure(s):  Right Reverse Total Shoulder Arthroplasty   (choice anes)  - Wound Class: I-Clean    Diagnosis:Arthritis Right Shoulder   Diagnosis Additional Information: No value filed.    Anesthesia Type:  General, ETT, Periph. Nerve Block for postop pain    Note:  Anesthesia Post Evaluation    Patient location during evaluation: PACU  Patient participation: Able to fully participate in evaluation  Level of consciousness: awake  Pain management: adequate  Airway patency: patent  Cardiovascular status: acceptable  Respiratory status: acceptable  Hydration status: acceptable  PONV: none             Last vitals:  Vitals:    10/25/17 1215 10/25/17 1230 10/25/17 1245   BP: 97/57 105/59 102/60   Pulse:      Resp: 10 10 13   Temp: 36.7  C (98.1  F)  36.7  C (98.1  F)   SpO2: 99% 98% 98%         Electronically Signed By: Nik Wilson MD  October 25, 2017  1:00 PM

## 2017-10-25 NOTE — PLAN OF CARE
Problem: Shoulder Arthroplasty (Adult)  Goal: Signs and Symptoms of Listed Potential Problems Will be Absent, Minimized or Managed (Shoulder Arthroplasty)  Signs and symptoms of listed potential problems will be absent, minimized or managed by discharge/transition of care (reference Shoulder Arthroplasty (Adult) CPG).   Outcome: Improving  Pt arrived to unit at 1245 and was oriented to room and call light.  VS:    VS and capnography stable; on RA.   Output:    Borden in place with adequate output; removed at 1745. Pt states she has urinary frequency. LBM 10/24. +fl   Activity:    Tolerated sitting and standing at bedside with min A   Skin: Intact except for incision and drain.   Pain:    Denies pain or the need for pain medications. Educated about pain management plan.   CMS:    Numbness d/t exparel block   Dressing:    Aquacel CDI   Diet:    Advanced to regular which pt tolerated well   LDA:    PIV infusing; HV   Equipment:    Sling, HV, PCDs   Plan:    Likely home upon DC   Additional Info:    Pt received general anesthesia + exparel. Pre-op received tylenol and gabapentin. .

## 2017-10-25 NOTE — IP AVS SNAPSHOT
MRN:[id number]                      After Visit Summary   10/25/2017    Christiana Cadet    MRN: [id number]           Thank you!     Thank you for choosing [facility] for your care. Our goal is always to provide you with excellent care. Hearing back from our patients is one way we can continue to improve our services. Please take a few minutes to complete the written survey that you may receive in the mail after you visit with us. Thank you!        Patient Information     Date Of Birth          1950        Designated Caregiver       Most Recent Value    Caregiver    Will someone help with your care after discharge? yes    Name of designated caregiver Brittanie    Phone number of caregiver [phone]    Caregiver address [place]      About your hospital stay     You were admitted on:  October 25, 2017 You last received care in the:  [facility]    You were discharged on:  October 27, 2017        Reason for your hospital stay       Right reverse total shoulder arthroplasty                  Who to Call     For medical emergencies, please call 911.  For non-urgent questions about your medical care, please call your primary care provider or clinic, [phone]  For questions related to your surgery, please call your surgery clinic        Attending Provider     Provider Lucas Olson MD Orthopedics       Primary Care Provider Office Phone # Fax #    Rand J Manolo [phone]       When to contact your care team       Call your physician for fevers greater than 101.5, chills, increased pain, redness, swelling or discharge at the surgical site. During regular business hours call Dr Olea's office and request to speak with his nurse or the triage nurses. If you see him at [facility] call [phone]. If you see him at [facility] [place] call [phone]. After hours or on weekends call the hospital  at [phone] and ask to speak with the  resident on call.                  After Care Instructions     Activity       Your activity upon discharge: No right shoulder range of motion.  Keep your shoulder immobilizer at all times, you may remove it only for axillary hygiene [to wash your armpit]. No weight bearing though your right upper extremity.            Diet       Follow this diet upon discharge: Regular            Discharge Instructions       For 24 hours after surgery  1. Get plenty of rest.   2. Do not drive or use heavy equipment. If you have weakness or tingling, don't drive or use heavy equipment until this feeling goes away.  3. Do not drink alcohol.  4. Avoid strnuois or risky activities. Ask for help when climbing stairs.  5. You may feel lightheaded. If so, sit for a few minutes before standing. Have someone help you get up.   6. If you have nausea (feel sick to your stomach), drink only clear liquids such as apple juice, ginger ale, broth or 7-Up. Rest may also help. Be sure to drink enough fluids. Move to a regular diet as you feel able.   7. You may have a slight fever. Call your doctor if your fever is over 100 F (37.7 C) (taken under the tongue) or lasts longer than 24 hours.   8. You may have a dry mouth, a sore throat, muscle aches or trouble sleeping. These should go away after 24 hours.   9. Do not make important or or legal decisions.     Call your doctor for any of the followin. Signs of infection such as fever, growing tenderness at the surgery site, a large amount of drainage or bleeding, severe pain, foul-smelling drainage, redness, swelling.  2. It has been over 8 hours to 10 hours since surgery, and you are still not able to urinate (or pass water).   3. Headache for over 24 hours.   4. Numbness, tingling or weakness the day after surgery (if you had spinal anesthesia).   To contact your doctor call [phone] and ask for resident on call for Orthopedics or call Emergency department [facility] [phone]  "[facility] [phone].            Wound care and dressings       You have a brown dressing on which should remain in place 5 days. You may shower over this dressing. After 5 days you may remove it. Underneath there may be steri-strips. Leave these in place. You may shower with your wound un covered as long as it is clean and dry. Do not scrub your wound. You may leave your wound uncovered as long as it is clean and dry. Notify your physician if you notice any drainage.  .                  Follow-up Appointments     Follow Up and recommended labs and tests       Follow up with Dr Lomeli in two weeks. If you see him [place], call the office at [phone] to schedule an appointment if you have not already done so. If you see him at [facility] call [phone] to schedule that appoinment..                  Pending Results     No orders found from 10/23/2017 to 10/26/2017.            Statement of Approval     Ordered          10/26/17 1401  I have reviewed and agree with all the recommendations and orders detailed in this document.  EFFECTIVE NOW     Approved and electronically signed by:  Lucas Lomeli MD             Admission Information     Date & Time Provider Department Dept. Phone    10/25/2017 Lucas Lomeli MD UR 8A [phone]      Your Vitals Were     Blood Pressure Pulse Temperature Respirations Height Weight    110/43 93 98.9  F (37.2  C) 16 1.692 m (5' 6.6\") 80.8 kg (178 lb 2.1 oz)    Pulse Oximetry BMI (Body Mass Index)                94% 28.24 kg/m2          [employer] Information     [employer] lets you send messages to your doctor, view your test results, renew your prescriptions, schedule appointments and more. To sign up, go to [email or web address] . Click on \"Log in\" on the left side of the screen, which will take you to the Welcome page. Then click on \"Sign up Now\" on the right side of the page.     You will be asked to enter the access code listed below, as well as some " personal information. Please follow the directions to create your username and password.     Your access code is: [email or web address]  Expires: 2017  8:30 AM     Your access code will  in 90 days. If you need help or a new code, please call your [place] clinic or [phone].        Care EveryWhere ID     This is your Care EveryWhere ID. This could be used by other organizations to access your [place] medical records  [id number]        Equal Access to Services     [facility] HospitalGERALDINE : Hadii cecelia cox hadasho Soomaali, waaxda luqadaha, qaybta kaalmada adeegyada, luis a prabhakar . So [facility] [phone].    ATENCIÓN: Si habla español, tiene a zimmerman disposición servicios gratuitos de asistencia lingüística. Llame al [phone].    We comply with applicable federal civil rights laws and [place] laws. We do not discriminate on the basis of race, color, national origin, age, disability, sex, sexual orientation, or gender identity.               Review of your medicines      START taking        Dose / Directions    prochlorperazine 5 MG tablet   Commonly known as:  COMPAZINE        Dose:  5-10 mg   Take 1-2 tablets (5-10 mg) by mouth every 6 hours as needed for nausea or vomiting   Quantity:  40 tablet   Refills:  0       traMADol 50 MG tablet   Commonly known as:  ULTRAM        Dose:  25-50 mg   Take 0.5-1 tablets (25-50 mg) by mouth every 4 hours as needed for moderate pain   Quantity:  50 tablet   Refills:  0         CONTINUE these medicines which have NOT CHANGED        Dose / Directions    ASPIR-81 PO        Take by mouth daily   Refills:  0       calcium-vitamin D 500-125 MG-UNIT Tabs        Take by mouth daily   Refills:  0       DITROPAN PO        Dose:  5 mg   Take 5 mg by mouth 2 times daily   Refills:  0       EXCEDRIN MIGRAINE PO        Take by mouth as needed   Refills:  0       FIBERCON 625 MG tablet   Generic drug:  calcium polycarbophil        Dose:  2 tablet   Take 2 tablets by  mouth daily   Refills:  0       magnesium 250 MG tablet        Dose:  1 tablet   Take 1 tablet by mouth daily   Refills:  0       ONE-A-DAY ESSENTIAL PO        Take by mouth daily   Refills:  0       SYNTHROID PO        Dose:  100 mcg   Take 100 mcg by mouth daily   Refills:  0            Where to get your medicines      These medications were sent to [facility] [place], [place] - [address]  [address], [facility] [address]     Phone:  [phone]     prochlorperazine 5 MG tablet         Some of these will need a paper prescription and others can be bought over the counter. Ask your nurse if you have questions.     Bring a paper prescription for each of these medications     traMADol 50 MG tablet                Protect others around you: Learn how to safely use, store and throw away your medicines at www.disposemymeds.org.             Medication List: This is a list of all your medications and when to take them. Check marks below indicate your daily home schedule. Keep this list as a reference.      Medications           Morning Afternoon Evening Bedtime As Needed    ASPIR-81 PO   Take by mouth daily                                calcium-vitamin D 500-125 MG-UNIT Tabs   Take by mouth daily                                DITROPAN PO   Take 5 mg by mouth 2 times daily                                EXCEDRIN MIGRAINE PO   Take by mouth as needed                                FIBERCON 625 MG tablet   Take 2 tablets by mouth daily   Generic drug:  calcium polycarbophil                                magnesium 250 MG tablet   Take 1 tablet by mouth daily                                ONE-A-DAY ESSENTIAL PO   Take by mouth daily                                prochlorperazine 5 MG tablet   Commonly known as:  COMPAZINE   Take 1-2 tablets (5-10 mg) by mouth every 6 hours as needed for nausea or vomiting   Last time this was given:  5 mg on 10/27/2017  7:05 AM                                 SYNTHROID PO   Take 100 mcg by mouth daily   Last time this was given:  100 mcg on 10/27/2017  8:51 AM                                traMADol 50 MG tablet   Commonly known as:  ULTRAM   Take 0.5-1 tablets (25-50 mg) by mouth every 4 hours as needed for moderate pain   Last time this was given:  25 mg on 10/26/2017  9:48 AM

## 2017-10-26 ENCOUNTER — APPOINTMENT (OUTPATIENT)
Dept: OCCUPATIONAL THERAPY | Facility: CLINIC | Age: 67
DRG: 483 | End: 2017-10-26
Attending: ORTHOPAEDIC SURGERY
Payer: COMMERCIAL

## 2017-10-26 LAB — HGB BLD-MCNC: 10.9 G/DL (ref 11.7–15.7)

## 2017-10-26 PROCEDURE — 36415 COLL VENOUS BLD VENIPUNCTURE: CPT | Performed by: ORTHOPAEDIC SURGERY

## 2017-10-26 PROCEDURE — 40000133 ZZH STATISTIC OT WARD VISIT: Performed by: OCCUPATIONAL THERAPIST

## 2017-10-26 PROCEDURE — 85018 HEMOGLOBIN: CPT | Performed by: ORTHOPAEDIC SURGERY

## 2017-10-26 PROCEDURE — 12000001 ZZH R&B MED SURG/OB UMMC

## 2017-10-26 PROCEDURE — 97165 OT EVAL LOW COMPLEX 30 MIN: CPT | Mod: GO | Performed by: OCCUPATIONAL THERAPIST

## 2017-10-26 PROCEDURE — 25000132 ZZH RX MED GY IP 250 OP 250 PS 637: Performed by: ORTHOPAEDIC SURGERY

## 2017-10-26 PROCEDURE — 25000128 H RX IP 250 OP 636: Performed by: ORTHOPAEDIC SURGERY

## 2017-10-26 PROCEDURE — 25000125 ZZHC RX 250: Performed by: NURSE PRACTITIONER

## 2017-10-26 PROCEDURE — 97530 THERAPEUTIC ACTIVITIES: CPT | Mod: GO | Performed by: OCCUPATIONAL THERAPIST

## 2017-10-26 PROCEDURE — 25000132 ZZH RX MED GY IP 250 OP 250 PS 637: Performed by: NURSE PRACTITIONER

## 2017-10-26 PROCEDURE — 25000125 ZZHC RX 250: Performed by: ORTHOPAEDIC SURGERY

## 2017-10-26 PROCEDURE — 99231 SBSQ HOSP IP/OBS SF/LOW 25: CPT | Performed by: INTERNAL MEDICINE

## 2017-10-26 PROCEDURE — 99207 ZZC CDG-MDM COMPONENT: MEETS LOW - DOWN CODED: CPT | Performed by: INTERNAL MEDICINE

## 2017-10-26 PROCEDURE — 97535 SELF CARE MNGMENT TRAINING: CPT | Mod: GO | Performed by: OCCUPATIONAL THERAPIST

## 2017-10-26 RX ORDER — SCOLOPAMINE TRANSDERMAL SYSTEM 1 MG/1
1 PATCH, EXTENDED RELEASE TRANSDERMAL
Status: DISCONTINUED | OUTPATIENT
Start: 2017-10-26 | End: 2017-10-27 | Stop reason: HOSPADM

## 2017-10-26 RX ORDER — OXYCODONE HYDROCHLORIDE 5 MG/1
5-10 TABLET ORAL EVERY 4 HOURS PRN
Qty: 50 TABLET | Refills: 0 | Status: SHIPPED | OUTPATIENT
Start: 2017-10-26 | End: 2017-10-27

## 2017-10-26 RX ADMIN — OXYCODONE HYDROCHLORIDE 10 MG: 5 TABLET ORAL at 05:40

## 2017-10-26 RX ADMIN — SENNOSIDES AND DOCUSATE SODIUM 1 TABLET: 8.6; 5 TABLET ORAL at 05:40

## 2017-10-26 RX ADMIN — CEFAZOLIN SODIUM 2 G: 2 INJECTION, SOLUTION INTRAVENOUS at 00:07

## 2017-10-26 RX ADMIN — PROCHLORPERAZINE EDISYLATE 5 MG: 5 INJECTION INTRAMUSCULAR; INTRAVENOUS at 13:27

## 2017-10-26 RX ADMIN — ACETAMINOPHEN 975 MG: 325 TABLET, FILM COATED ORAL at 16:57

## 2017-10-26 RX ADMIN — ACETAMINOPHEN 975 MG: 325 TABLET, FILM COATED ORAL at 05:40

## 2017-10-26 RX ADMIN — LEVOTHYROXINE SODIUM 100 MCG: 100 TABLET ORAL at 05:40

## 2017-10-26 RX ADMIN — ACETAMINOPHEN 975 MG: 325 TABLET, FILM COATED ORAL at 00:07

## 2017-10-26 RX ADMIN — TRAMADOL HYDROCHLORIDE 25 MG: 50 TABLET, FILM COATED ORAL at 09:48

## 2017-10-26 RX ADMIN — SCOLOPAMINE TRANSDERMAL SYSTEM 1 PATCH: 1 PATCH, EXTENDED RELEASE TRANSDERMAL at 10:24

## 2017-10-26 RX ADMIN — ONDANSETRON 4 MG: 4 TABLET, ORALLY DISINTEGRATING ORAL at 07:15

## 2017-10-26 RX ADMIN — SENNOSIDES AND DOCUSATE SODIUM 1 TABLET: 8.6; 5 TABLET ORAL at 20:17

## 2017-10-26 NOTE — PROGRESS NOTES
"REGIONAL ANESTHESIA PAIN SERVICE  SUBJECTIVE: Reports good pain control with  analgesics and nerve block injection.  Pt endorses a little numbness and tingling in the R) arm.   Patient is experiencing nausea this am - RN aware.  Denies any weakness, paresthesias, circumoral numbness, metallic taste or tinnitus.       Clinically Aligned Pain Assessment (CAPA):  Comfort (How is your pain?): Comfortably manageable  Change in Pain (Since your last medication/intervention?): About the same  Pain Control (How are your pain treatments working?):  Partially effective pain control    Medications related to Pain Management (Future)    Start     Dose/Rate Route Frequency Ordered Stop    10/28/17 0000  acetaminophen (TYLENOL) tablet 650 mg      650 mg Oral EVERY 4 HOURS PRN 10/25/17 1208      10/25/17 2000  senna-docusate (SENOKOT-S;PERICOLACE) 8.6-50 MG per tablet 1-2 tablet      1-2 tablet Oral 2 TIMES DAILY 10/25/17 1317      10/25/17 1317  bupivacaine liposome (EXPAREL) LONG ACTING injection was administered into the infiltration site to produce postsurgical analgesia. Duration of action is up to 72 hours, and other \"elba\" medications should not be given for 96 hours with the exception of the lidocaine 5% patch (LIDODERM) and the lidocaine 10mg in potassium infusions. This entry is for INFORMATION ONLY.       Does not apply CONTINUOUS PRN 10/25/17 1317 10/29/17 1316    10/25/17 1317  lidocaine 1 % 1 mL      1 mL Other EVERY 1 HOUR PRN 10/25/17 1317      10/25/17 1317  lidocaine (LMX4) kit       Topical EVERY 1 HOUR PRN 10/25/17 1317      10/25/17 1215  acetaminophen (TYLENOL) tablet 975 mg      975 mg Oral EVERY 8 HOURS 10/25/17 1208 10/28/17 0759    10/25/17 1208  morphine (PF) injection 2-4 mg      2-4 mg Intravenous EVERY 2 HOURS PRN 10/25/17 1208      10/25/17 1208  oxyCODONE (ROXICODONE) IR tablet 5-10 mg      5-10 mg Oral EVERY 3 HOURS PRN 10/25/17 1208      10/25/17 1208  hydrOXYzine (ATARAX) tablet 25 mg      25 mg " "Oral EVERY 6 HOURS PRN 10/25/17 1208            OBJECTIVE:    Blood pressure 102/51, pulse 71, temperature 97.7  F (36.5  C), temperature source Oral, resp. rate 16, height 1.692 m (5' 6.6\"), weight 80.8 kg (178 lb 2.1 oz), SpO2 99 %.  R) arm in immobilizer.  Pt is able to wiggle fingers on the right hand.      ASSESSMENT/PLAN:    Christiana Cadet is a 66 year old female with h/o R) shoulder rotator cuff tear arthropathy, now POD #1 s/p  REVERSE ARTHROPLASTY SHOULDER with single shot R) interscalen nerve block injection.  Total bupivacaine 0.25% with epinephrine 1:200,000 10mL, then liposome bupivacaine (Exparel) 1.3% 10mL administered 10/25/17 for postop pain control.  Pt is ambulating without difficulty.  No weakness or paresthesias.  No evidence of adverse side effects associated with R) interscalene nerve block injection.  Pt acheiving adequate pain control, with nerve block, oral and IV analgesics.  Anticipate up to 72 hours of pain control with long-acting local anesthetic. Additionally, pt will continue to require opioid/nonopioid analgesics for visceral and muscle pain not controlled with local anesthetic.  Pt is using cold packs PRN.    - NO other local anesthetic use within 96 hours of liposome bupivacaine (Exparel)  - patient received verbal and written instructions about liposome bupivacaine and counseling about pharmacologic and nonpharmacologic measures for acute postoperative pain management  - please call if questions or concerns      DEEPTI Bonilla CNP  [facility]ional Anesthesia Pain Service  10/26/2017 7:51 AM    24 hour Job Code Pager.  For in-house use only.     Dial * * [phone] and  [place]:  [phone]  [place]: [phone]  Peds:  [phone]  Enter call-back number  May text page using [email or web address], but NOT [employer].   "

## 2017-10-26 NOTE — PLAN OF CARE
Problem: Patient Care Overview  Goal: Individualization & Mutuality        10/27  07:00-15:30  VS:    Stable  Lungs CTA, denies chest pain/SOB   Output:    Voiding spontaneously without difficulty   Activity:    SBA  Ambulating to and from bathroom and in hallway   Skin: Intact except for surgical incision   Pain:    Pt reporting nausea, possibly from oxycodone.  Changed to Ultram 25-50 mg q 4h.  Received 25 mg x 1 this shift.  Ice pack provided and refreshed through out the shift   CMS:    Intact  Denies numbness/tingling   Dressing:    Right shoulder aquacel dressing CDI  Hemovac dressing CDI   Diet:    Regular  Nausea this morning, Zofran given by previous nurse not effective.  EUGENIO Stein notified.  Oxycodone discontinued, changed to Ultram.  Scopolamine patch also added.  Continued to have intermittent nausea, IV compazine given @  13:27.    Pt had ginger ale, broth and tea for lunch.    Tolerated 1 1/2 slices of toast this afternoon.   LDA:    PIV left hand saline locked;  Hemovac drain removed by GYPSY Stein this shift   Equipment:    Sling with pillow   Plan:    TBD   Additional Info:    Pt reports she has light sensitivity (wears tinted glasses) and is prone to motion sickness.

## 2017-10-26 NOTE — PLAN OF CARE
Problem: Patient Care Overview  Goal: Plan of Care/Patient Progress Review  Outcome: No Change  Patient A/Ox4. Refused CAPNO. VSS ex BP trending a little low. Denies CP, SOB, dizziness/LH. LSCTA. +fl/BS. Voiding well in bathroom. CMS intact ex block is still in place. Dressing to shoulder CDI, ice applied. Tolerating regular diet without NV. IS encouraged. Activity level is great, pt ambulated in the peralta on evenings and has been up to the bathroom x2. IV in place, SL. Pain rated as essentially nonexistent throughout shift, will offer pain medication before shift change. Patient has demonstrated ability to call appropriately. Patient is resting with call light within reach. Will continue to monitor.

## 2017-10-26 NOTE — DISCHARGE SUMMARY
ORTHOPAEDIC DISCHARGE SUMMARY     Date of Admission: 10/25/2017  Date of Discharge: 10/27/2017  Disposition: Home  Staff Physician: Lucas Lomeli*  Primary Care Provider: Rand French    DISCHARGE DIAGNOSIS:  Arthritis Right Shoulder     PROCEDURES: Procedure(s):  REVERSE ARTHROPLASTY SHOULDER on 10/25/2017    BRIEF HISTORY:  Per 10/25 Operative Report [Dr. Lomeli]:  Ms. Christiana Cadet is a very pleasant woman with history of pain and disability in her shoulder.  She had a previous rotator cuff repair that failed and went on to have progressive arthrosis.  After biopsies to rule out infection (which were all negative), she was here scheduled for reverse total shoulder arthroplasty.     HOSPITAL COURSE:    Surgery was uncomplicated. Christiana Cadet has done well post-operatively. Medicine was consulted post operatively to aid in management of medical comorbidities. See final recommendations below. The patient received routine nursing cares and is medically stable. Vital signs are stable. The patient is tolerating a regular diet without GI distress/nausea or vomiting. Voiding spontaneously. All PT/OT goals have been met for safe mobility. Pain is now controlled on oral medications which will be available on discharge. Stool softeners have been used while taking pain medications to help prevent constipation. Christiana Cadet is deemed medically safe to discharge.     Ortho Primary  Activity: Up with assist. No shoulder ROM x 6 weeks, SI at all times save for hygiene.  Weight bearing status: NWB RUE x 6 weeks  Antibiotics/Tetanus: Ancef x 24 hours, complete  Diet:  progress diet as tolerated.   DVT prophylaxis: mechanical while in the hospital  Bracing/Splinting: SI to be kept clean, dry, and intact until follow-up.   Elevation: Elevate RUE on pillows to keep above the level of the heart as much as possible.   Wound Care: Dressing change on POD #5-7  Drains: d/c'd 10/26  Pain management: transition from IV  to orals as tolerated.    Physical Therapy/Occupational Therapy: transfers, ADL's.   Consults: PT, OT. medicine, appreciate assistance in caring for this patient.   Follow-up: Clinic with Dr. Lomeli in 2 weeks (on or around 11/8) for wound check with no x-rays needed.       Current Discharge Medication List      START taking these medications    Details   oxyCODONE (ROXICODONE) 5 MG IR tablet Take 1-2 tablets (5-10 mg) by mouth every 4 hours as needed for moderate to severe pain  Qty: 50 tablet, Refills: 0    Associated Diagnoses: History of total replacement of right shoulder joint         CONTINUE these medications which have NOT CHANGED    Details   Levothyroxine Sodium (SYNTHROID PO) Take 100 mcg by mouth daily       Oxybutynin Chloride (DITROPAN PO) Take 5 mg by mouth 2 times daily      calcium polycarbophil (FIBERCON) 625 MG tablet Take 2 tablets by mouth daily      magnesium 250 MG tablet Take 1 tablet by mouth daily      calcium-vitamin D 500-125 MG-UNIT TABS Take by mouth daily       Multiple Vitamin (ONE-A-DAY ESSENTIAL PO) Take by mouth daily      Aspirin-Acetaminophen-Caffeine (EXCEDRIN MIGRAINE PO) Take by mouth as needed      Aspirin (ASPIR-81 PO) Take by mouth daily               Discharge Procedure Orders  Reason for your hospital stay   Order Comments: Right reverse total shoulder arthroplasty     Follow Up and recommended labs and tests   Order Comments: Follow up with Dr Lomeli in two weeks. If you see him [place], call the office at [phone] to schedule an appointment if you have not already done so. If you see him at [facility] call [phone] to schedule that appoinment..     When to contact your care team   Order Comments: Call your physician for fevers greater than 101.5, chills, increased pain, redness, swelling or discharge at the surgical site. During regular business hours call Dr Olea's office and request to speak with his nurse or the triage nurses. If you see him at [facility] call  [phone]. If you see him at [facility] [place] call [phone]. After hours or on weekends call the hospital  at [phone] and ask to speak with the resident on call.     Wound care and dressings   Order Comments: You have a brown dressing on which should remain in place 5 days. You may shower over this dressing. After 5 days you may remove it. Underneath there may be steri-strips. Leave these in place. You may shower with your wound un covered as long as it is clean and dry. Do not scrub your wound. You may leave your wound uncovered as long as it is clean and dry. Notify your physician if you notice any drainage.  .     Activity   Order Comments: Your activity upon discharge: No right shoulder range of motion.  Keep your shoulder immobilizer at all times, you may remove it only for axillary hygiene [to wash your armpit]. No weight bearing though your right upper extremity.   Order Specific Question Answer Comments   Is discharge order? Yes      Discharge Instructions   Order Comments: For 24 hours after surgery  1. Get plenty of rest.   2. Do not drive or use heavy equipment. If you have weakness or tingling, don't drive or use heavy equipment until this feeling goes away.  3. Do not drink alcohol.  4. Avoid strnuois or risky activities. Ask for help when climbing stairs.  5. You may feel lightheaded. If so, sit for a few minutes before standing. Have someone help you get up.   6. If you have nausea (feel sick to your stomach), drink only clear liquids such as apple juice, ginger ale, broth or 7-Up. Rest may also help. Be sure to drink enough fluids. Move to a regular diet as you feel able.   7. You may have a slight fever. Call your doctor if your fever is over 100 F (37.7 C) (taken under the tongue) or lasts longer than 24 hours.   8. You may have a dry mouth, a sore throat, muscle aches or trouble sleeping. These should go away after 24 hours.   9. Do not make important or or legal  decisions.     Call your doctor for any of the followin. Signs of infection such as fever, growing tenderness at the surgery site, a large amount of drainage or bleeding, severe pain, foul-smelling drainage, redness, swelling.  2. It has been over 8 hours to 10 hours since surgery, and you are still not able to urinate (or pass water).   3. Headache for over 24 hours.   4. Numbness, tingling or weakness the day after surgery (if you had spinal anesthesia).   To contact your doctor call [phone] and ask for resident on call for Orthopedics or call Emergency department [facility] [phone] [facility] [phone].     Full Code     Diet   Order Comments: Follow this diet upon discharge: Regular   Order Specific Question Answer Comments   Is discharge order? Yes            J.D. Schirmers, M.D.  Orthopaedic Surgery-PGY4  [phone]

## 2017-10-26 NOTE — PROGRESS NOTES
"[facility], [place]   Internal Medicine Daily Note          Assessment and Plan:    66 yr old S/P reverse shoulder arthroplasty.     Plan:   S/P Rt reverse arthroplasty:  Management by primary team. Please see their notes for further details  Stop IV fluids   Pain control with Exparel block,  acetaminophen 975 mg every 8 hrs for 3 days,Oxycodone   5-10   mg every 3 hrs PRN and IV Morphine  2-4  mg q 3 hrs for breakthrough pain   DVT prophylaxis with  SCDs while in hospital,  PT/OT as per protocol   Incentive spirometry and aggressive bowel regimen  We will monitor for acute blood loss anemia.  Post op Hgb at 10.9      Resume home dose of levothyroxine   Resume Oxybutynin after removal of brooks            Consulting teams: Internal medicine   Code status: full   DVT Prophylaxis: PCD's , ambulate   Gastric prophylaxis: None   Diet: Regular adult   Disposition: To be determined       Patient seen and examined with RN         Interval History:   Progress notes in the last 24 hrs by MDT team has been reviewed.  Christiana had nausea and vomiting this morning, Which subsequently resolved with use of antiemetics  Denies chest pain/ SOB   No fevers or chills       Review of Systems:   A comprehensive review of systems was performed and found to be negative except: Those that are outlined in interval history              Medications:   I have reviewed this patient's current medications which are outlined in the \"current medication\" section of EPIC             Physical Exam:   Vitals were reviewed  Temp: 97.3  F (36.3  C) Temp src: Oral BP: 103/49 Pulse: 76 Heart Rate: 92 Resp: 16 SpO2: 97 % O2 Device: None (Room air)      Constitutional:   awake, alert, cooperative, no apparent distress, and appears stated age     Lungs:   No increased work of breathing, good air exchange, clear to auscultation bilaterally, no crackles or wheezing     Cardiovascular:   Normal apical impulse, regular rate and rhythm, " normal S1 and S2, no S3 or S4, and no murmur noted     Abdomen:   No scars, normal bowel sounds, soft, non-distended, non-tender, no masses palpated, no hepatosplenomegally     Musculoskeletal:   Rt shoulder covered in dressings. Drain still present. No neurological deficit      Neurologic:   Awake, alert, oriented to name, place and time.  Cranial nerves II-XII are grossly intact.                           Data:     Most recent labs have been evaluated and relevant labs are outlined below :BMP    Recent Labs  Lab 10/25/17  0802   GLC 88     CBC    Recent Labs  Lab 10/26/17  0753   HGB 10.9*     INRNo lab results found in last 7 days.  LFTsNo lab results found in last 7 days.   PANCNo lab results found in last 7 days.        Case d/w orthopedic team     Dr ELOY Walsh MD  Hospitalist ( Internal medicine)  Pager: [phone]

## 2017-10-26 NOTE — PROGRESS NOTES
10/26/17 0913   Quick Adds   Type of Visit Initial Occupational Therapy Evaluation   Living Environment   Lives With alone   Living Arrangements house   Number of Stairs to Enter Home 2   Number of Stairs Within Home 10   Transportation Available car   Living Environment Comment Walk-in shower, seat built in   Self-Care   Dominant Hand right   Usual Activity Tolerance good   Current Activity Tolerance moderate   Regular Exercise other (see comments)   Equipment Currently Used at Home none   Activity/Exercise/Self-Care Comment Patient independent in self-cares/mobility without AE   Functional Level Prior   Ambulation 0-->independent   Transferring 0-->independent   Toileting 0-->independent   Bathing 0-->independent   Dressing 0-->independent   Eating 0-->independent   Communication 0-->understands/communicates without difficulty   Swallowing 0-->swallows foods/liquids without difficulty   Cognition 0 - no cognition issues reported   Fall history within last six months no   Prior Functional Level Comment Patient independent in self-cares/mobility without AE   General Information   Onset of Illness/Injury or Date of Surgery - Date 10/25/17   Referring Physician Dr. Lomeli   Patient/Family Goals Statement return home to baseline   Additional Occupational Profile Info/Pertinent History of Current Problem POD #1 s/p R reverse TSA   Precautions/Limitations other (see comments)  (reverse protocol)   Weight-Bearing Status - RUE nonweight-bearing   General Observations On RA, up in chair, has been ambulating independently, more nausea   Cognitive Status Examination   Cognitive Comment No cognitive concerns   Sensory Examination   Sensory Comments N/T present in R UE   Pain Assessment   Patient Currently in Pain Yes, see Vital Sign flowsheet   Range of Motion (ROM)   ROM Comment L UE AROM WFL, no ROM of R UE   Transfer Skill: Bed to Chair/Chair to Bed   Level of [place]: Bed to Chair stand-by assist   Transfer Skill:  "Sit to Stand   Level of [place]: Sit/Stand stand-by assist   Transfer Skill: Toilet Transfer   Level of [place]: Toilet stand-by assist   Bathing   Level of [place] unable to perform   Upper Body Dressing   Level of [place]: Dress Upper Body minimum assist (75% patients effort)   Lower Body Dressing   Level of [place]: Dress Lower Body stand-by assist   Toileting   Level of [place]: Toilet stand-by assist   Grooming   Level of [place]: Grooming independent   Eating/Self Feeding   Level of [place]: Eating independent   Activities of Daily Living Analysis   Impairments Contributing to Impaired Activities of Daily Living pain;post surgical precautions;ROM decreased;strength decreased   General Therapy Interventions   Planned Therapy Interventions ADL retraining;ROM   Clinical Impression   Criteria for Skilled Therapeutic Interventions Met yes, treatment indicated   OT Diagnosis impaired self-cares/mobility   Influenced by the following impairments pain; shoulder precautions   Assessment of Occupational Performance 1-3 Performance Deficits   Identified Performance Deficits dressing, bathing, toileting   Clinical Decision Making (Complexity) Low complexity   Therapy Frequency 2 times/day   Predicted Duration of Therapy Intervention (days/wks) 2-3 days   Anticipated Discharge Disposition Home   Risks and Benefits of Treatment have been explained. Yes   Patient, Family & other staff in agreement with plan of care Yes   [facility] TM \"6 Clicks\"   2016, Trustees of [facility], under license to [employer].  All rights reserved.   6 Clicks Short Forms Daily Activity Inpatient Short Form   [facility]  \"6 Clicks\" Daily Activity Inpatient Short Form   1. Putting on and taking off regular lower body clothing? 4 - None   2. Bathing (including washing, rinsing, drying)? 3 - A Little   3. Toileting, which includes using toilet, bedpan or urinal? 4 - None "   4. Putting on and taking off regular upper body clothing? 3 - A Little   5. Taking care of personal grooming such as brushing teeth? 4 - None   6. Eating meals? 4 - None   Daily Activity Raw Score (Score out of 24.Lower scores equate to lower levels of function) 22   Total Evaluation Time   Total Evaluation Time (Minutes) 8

## 2017-10-26 NOTE — PLAN OF CARE
Problem: Patient Care Overview  Goal: Plan of Care/Patient Progress Review  Discharge Planner OT   Patient plan for discharge: Home alone  Current status: Patient doing very well, ambulating 250' and completing steps.  Independently managed ADLs with training for sling management and precautions.  Patient reports R shoulder block still in place, AAROM to R elbow and AROM of R wrist and hand completed.  Barriers to return to prior living situation: None  Recommendations for discharge: Home alone, anticipate POD #2  Rationale for recommendations: OT 2x/day POD #1, 1x/day POD #2       Entered by: Erika Burger 10/26/2017 10:53 AM

## 2017-10-26 NOTE — PROGRESS NOTES
Care Coordinator- Discharge Planning     Admission Date/Time:  10/25/2017  Attending MD:  Lucas Lomeli*     Data  Date of initial CC assessment:  10/26/2017  Chart reviewed, discussed with interdisciplinary team.   Patient was admitted for:   1. History of total replacement of right shoulder joint         Assessment  Concerns with insurance coverage for discharge needs: None.  Current Living Situation: Patient lives alone.  Support System: Unknown  Services Involved: None at this time  Transportation: Family or Friend will provide  Barriers to Discharge: None        Coordination of Care and Referrals: Per chart review, patient does not require any therapy or home care services at this time. CC to follow as needed.    Plan  Anticipated Discharge Date:  10/27/2017  Anticipated Discharge Plan:  Home     CTS Handoff completed:  YES    Nathalia Mon RN, BSN  Care Coordinator, 8A  Phone [phone]  Pager [phone]

## 2017-10-26 NOTE — PLAN OF CARE
Problem: Patient Care Overview  Goal: Plan of Care/Patient Progress Review  PT orders received for PT evaluation and treatment: s/p TSA.  Per OT pt does not have any skilled PT needs, PT order completed.

## 2017-10-27 ENCOUNTER — APPOINTMENT (OUTPATIENT)
Dept: OCCUPATIONAL THERAPY | Facility: CLINIC | Age: 67
DRG: 483 | End: 2017-10-27
Attending: ORTHOPAEDIC SURGERY
Payer: COMMERCIAL

## 2017-10-27 VITALS
BODY MASS INDEX: 27.96 KG/M2 | HEIGHT: 67 IN | DIASTOLIC BLOOD PRESSURE: 43 MMHG | WEIGHT: 178.13 LBS | OXYGEN SATURATION: 94 % | RESPIRATION RATE: 16 BRPM | SYSTOLIC BLOOD PRESSURE: 110 MMHG | TEMPERATURE: 98.9 F | HEART RATE: 93 BPM

## 2017-10-27 LAB — HGB BLD-MCNC: 11.7 G/DL (ref 11.7–15.7)

## 2017-10-27 PROCEDURE — 25000125 ZZHC RX 250: Performed by: ORTHOPAEDIC SURGERY

## 2017-10-27 PROCEDURE — 97535 SELF CARE MNGMENT TRAINING: CPT | Mod: GO | Performed by: OCCUPATIONAL THERAPIST

## 2017-10-27 PROCEDURE — 99207 ZZC CDG-MDM COMPONENT: MEETS LOW - DOWN CODED: CPT | Performed by: INTERNAL MEDICINE

## 2017-10-27 PROCEDURE — 36415 COLL VENOUS BLD VENIPUNCTURE: CPT | Performed by: ORTHOPAEDIC SURGERY

## 2017-10-27 PROCEDURE — 99231 SBSQ HOSP IP/OBS SF/LOW 25: CPT | Performed by: INTERNAL MEDICINE

## 2017-10-27 PROCEDURE — 25000132 ZZH RX MED GY IP 250 OP 250 PS 637: Performed by: ORTHOPAEDIC SURGERY

## 2017-10-27 PROCEDURE — 40000133 ZZH STATISTIC OT WARD VISIT: Performed by: OCCUPATIONAL THERAPIST

## 2017-10-27 PROCEDURE — 85018 HEMOGLOBIN: CPT | Performed by: ORTHOPAEDIC SURGERY

## 2017-10-27 RX ORDER — OXYBUTYNIN CHLORIDE 5 MG/1
5 TABLET ORAL 2 TIMES DAILY
Status: DISCONTINUED | OUTPATIENT
Start: 2017-10-27 | End: 2017-10-27 | Stop reason: HOSPADM

## 2017-10-27 RX ORDER — TRAMADOL HYDROCHLORIDE 50 MG/1
25-50 TABLET ORAL EVERY 4 HOURS PRN
Qty: 50 TABLET | Refills: 0 | Status: SHIPPED | OUTPATIENT
Start: 2017-10-27

## 2017-10-27 RX ORDER — PROCHLORPERAZINE MALEATE 5 MG
5-10 TABLET ORAL EVERY 6 HOURS PRN
Qty: 40 TABLET | Refills: 0 | Status: SHIPPED | OUTPATIENT
Start: 2017-10-27

## 2017-10-27 RX ADMIN — LEVOTHYROXINE SODIUM 100 MCG: 100 TABLET ORAL at 08:51

## 2017-10-27 RX ADMIN — PROCHLORPERAZINE MALEATE 5 MG: 5 TABLET, FILM COATED ORAL at 07:05

## 2017-10-27 RX ADMIN — SENNOSIDES AND DOCUSATE SODIUM 1 TABLET: 8.6; 5 TABLET ORAL at 08:11

## 2017-10-27 RX ADMIN — ONDANSETRON 4 MG: 4 TABLET, ORALLY DISINTEGRATING ORAL at 06:17

## 2017-10-27 RX ADMIN — ACETAMINOPHEN 975 MG: 325 TABLET, FILM COATED ORAL at 08:11

## 2017-10-27 RX ADMIN — ACETAMINOPHEN 975 MG: 325 TABLET, FILM COATED ORAL at 00:43

## 2017-10-27 NOTE — PLAN OF CARE
Problem: Shoulder Arthroplasty (Adult)  Goal: Signs and Symptoms of Listed Potential Problems Will be Absent, Minimized or Managed (Shoulder Arthroplasty)  Signs and symptoms of listed potential problems will be absent, minimized or managed by discharge/transition of care (reference Shoulder Arthroplasty (Adult) CPG).   Outcome: Improving            VS:    VSS. Incentive spirometry encouraged.   Output:    Pt voiding in bathroom without difficulty in adequate amounts. Per pt report, LBM 10/26. Passing flatus; BS active.   Activity:    Up ad myra, pt up with SBA. Pt ambulated in halls with NA this evening.    Skin: Skin is intact except for incisions. Skin is warm, dry, and without discoloration.   Pain:    Pain is being managed with PRN Ultram, pt has refused pain medications throughout shift. Given scheduled tylenol and ice applied to R shoulder.   CMS:    CMS intact except for slight numbness/tingling in RUE, improving per pt report. Strong handgrip bilaterally. Able to wiggle fingers bilaterally. Radial pulses +2 bilaterally.   Dressing:    Dressing to R shoulder CDI, no drainage visible. Ice applied.   Diet:    Pt tolerating regular diet without N/V. At beginning of shift, pt did have nausea and previous RN gave IV 5mg Compazine with relief. Pt denies nausea now. Adequate intake of PO fluids. Scop patch behind L ear in place.    LDA:    PIV removed due to pain when flushing. Hemovac removed this morning.   Equipment:    Personal belongings in room. Sling immobilizer on.   Plan:    Continue to monitor patient and manage pain. Plan to discharge tomorrow to home with assist; sister will transport.    Additional Info:    If pt becomes nauseous again, please give PO compazine. Pt has light sensitivity (wears darkened glasses) and has motion sickness.

## 2017-10-27 NOTE — PROGRESS NOTES
Progress Note  Christiana Cadet  : 1950       Assessment and Plan:    Christiana Cadet is a 66 year old  female status-post right reverse TSA on 10/25 with Dr. Lomeli the following:     Ortho Primary  Activity: Up with assist. No shoulder ROM x 6 weeks, SI at all times save for hygiene.  Weight bearing status: NWB RUE x 6 weeks  Antibiotics/Tetanus: Ancef x 24 hours,complete  Diet: progress diet as tolerated.   DVT prophylaxis: mechanical while in the hospital  Bracing/Splinting: SI to be kept clean, dry, and intact until follow-up.   Elevation: Elevate RUE on pillows to keep above the level of the heart as much as possible.   Wound Care: Dressing change at bedside by Ortho on POD #5-7  Drains: d/c'd 10/26  Pain management: transition from IV to orals as tolerated.    Physical Therapy/Occupational Therapy: transfers, ADL's.   Consults: PT, OT. medicine, appreciate assistance in caring for this patient.   Follow-up: Clinic with Dr. Lomeli in 2 weeks (on or around ) for wound check with no x-rays needed.      Disposition: Pending progress with therapies, pain control on orals, and medical stability, anticipate discharge to home POD #2. [sister will be giving her a ride to [place] where she lives alone].             Interval History:     No acute events overnight. Pain is adequately controlled on current regimen. Denies n/v, SOB, cp, dyspnea. She would like to d/c today. PLan of care d/w patient.          Physical Exam:     Physical Exam   Temp: 99.3  F (37.4  C) Temp src: Oral BP: 119/53 Pulse: 93 Heart Rate: 100 Resp: 18 SpO2: 93 % O2 Device: None (Room air)    Temp:  [99.1  F (37.3  C)-100.3  F (37.9  C)] 99.3  F (37.4  C)  Pulse:  [93] 93  Heart Rate:  [100] 100  Resp:  [18] 18  BP: (119-128)/(53-58) 119/53  SpO2:  [93 %-94 %] 93 % 178 lbs 2.11 oz   Gen:                                              No acute distress. Alert.  Pulm:                                            Non-labored breathing  ORA  Incision:                                        aquacel c/d/i  RUE:                                            SILT throughout ax/r/m/u distributions. Fires ain/pin/intrinsics. Sets deltoid. Capillary refill is brisk, ext are wwp. +radial pulse.        Labs:  CBC    Recent Labs  Lab 10/27/17  0625 10/26/17  0753   HGB 11.7 10.9*       J.D. Schirmers, MD   Orthopaedic Surgery Resident, PGY-5  Pager: [phone]    For questions about this patient, please contact me at my pager.

## 2017-10-27 NOTE — PROGRESS NOTES
"[facility], [place]   Internal Medicine Daily Note          Assessment and Plan:    66 yr old S/P reverse shoulder arthroplasty.     Plan:  S/P Rt reverse arthroplasty:  Management by primary team. Please see their notes for further details    Pain control with Exparel block,  acetaminophen 975 mg every 8 hrs for 3 days,Oxycodone   5-10   mg every 3 hrs PRN   DVT prophylaxis with  SCDs while in hospital,  PT/OT as per protocol   Incentive spirometry and aggressive bowel regimen  We will monitor for acute blood loss anemia.  Post op Hgb at 10.9      Resume home dose of levothyroxine   Resume Oxybutynin  Today            Consulting teams: Internal medicine   Code status: full   DVT Prophylaxis: PCD's , ambulate   Gastric prophylaxis: None   Diet: Regular adult   Disposition:  Medically stable to discharge today       Patient seen and examined with RN         Interval History:   Progress notes in the last 24 hrs by MDT team has been reviewed.   tyler feels well   Up and independent   Pain well controlled   wants to discharge today          Review of Systems:   A comprehensive review of systems was performed and found to be negative except: Those that are outlined in interval history              Medications:   I have reviewed this patient's current medications which are outlined in the \"current medication\" section of EPIC             Physical Exam:   Vitals were reviewed  Temp: 98.9  F (37.2  C) Temp src: Oral BP: 110/43 Pulse: 93 Heart Rate: 100 Resp: 16 SpO2: 94 % O2 Device: None (Room air)      Constitutional:   awake, alert, cooperative, no apparent distress, and appears stated age     Lungs:   No increased work of breathing, good air exchange, clear to auscultation bilaterally, no crackles or wheezing     Cardiovascular:   Normal apical impulse, regular rate and rhythm, normal S1 and S2, no S3 or S4, and no murmur noted     Abdomen:   No scars, normal bowel sounds, soft, non-distended, " non-tender, no masses palpated, no hepatosplenomegally     Musculoskeletal:   Rt shoulder covered in dressings. Drain removed . No neurological deficit      Neurologic:   Awake, alert, oriented to name, place and time.  Cranial nerves II-XII are grossly intact.             Data:     Most recent labs have been evaluated and relevant labs are outlined below :BMP    Recent Labs  Lab 10/25/17  0802   GLC 88     CBC    Recent Labs  Lab 10/27/17  0625   HGB 11.7     INRNo lab results found in last 7 days.  LFTsNo lab results found in last 7 days.   PANCNo lab results found in last 7 days.        Case d/w orthopedic team     Dr ELOY Walsh MD  Hospitalist ( Internal medicine)  Pager: [phone]

## 2017-10-27 NOTE — PLAN OF CARE
Problem: Patient Care Overview  Goal: Plan of Care/Patient Progress Review  Discharge Planner OT   Patient plan for discharge: Home alone  Current status: Goals met.  Completed full shower and dressing tasks with Mod I.  Patient completed AROM of R elbow/wrist and hand, reports block has worn off.  Barriers to return to prior living situation: None  Recommendations for discharge: Home alone  Rationale for recommendations: No further OT needs.     Occupational Therapy Discharge Summary     Reason for therapy discharge:    All goals and outcomes met, no further needs identified.     Progress towards therapy goal(s). See goals on Care Plan in [facility] electronic health record for goal details.  Goals met     Therapy recommendation(s):    No further therapy is recommended.             Entered by: Erika Burger 10/27/2017 11:07 AM

## 2017-10-27 NOTE — PLAN OF CARE
Problem: Patient Care Overview  Goal: Individualization & Mutuality     10/27/17 07:30-12:55      VS:    Stable.   Lungs CTA, denies chest pain/SOB   Output:    Voiding spontaneously without difficulty.  BS+ all quadrants, + flatus BM 10/27   Activity:    Up with SBA, steady on feet.  Ambulated in [place] with SBA.  Showered with SBA of OT this morning.   Skin: Intact except for surgical incision   Pain:    Managed with ice packs and scheduled tylenol   CMS:    Intact  Denies numbness/tingling   Dressing:    Right shoulder dressing CDI   Diet:    Regular, tolerating well.  No nausea reported this shift   LDA:    none   Equipment:    Sling with pillow   Plan:    Discharge home today.   Additional Info:     Discharge instructions and medications reviewed with pt.  Pt verbalized understanding.    Discharged home with all personal belongings and medications @ 12:55.

## 2017-10-27 NOTE — PLAN OF CARE
Problem: Shoulder Arthroplasty (Adult)  Goal: Signs and Symptoms of Listed Potential Problems Will be Absent, Minimized or Managed (Shoulder Arthroplasty)  Signs and symptoms of listed potential problems will be absent, minimized or managed by discharge/transition of care (reference Shoulder Arthroplasty (Adult) CPG).   Outcome: Improving  [id number] Shift Summary: VSS. Pt states pain is tolerable and pain control is adequate. Declined prn pain meds this shift. Pt reported some back pain which was resolved with repositioning and walking in the hallway. Ice to shoulder. Drsg CDI. CMS intact. 2+ radial pulses. Denies cp, sob, calf pain. Pt reporting some nausea this morning which was unresolved with 4 mg PO zofran, given 5 mg po compazine - pt and oncoming nurse aware that another 5 mg is available, if needed. Tolerating regular diet. Pt up with SBA. Lungs CTA. Voiding adequately. Able to make needs known. Continue to monitor.

## (undated) DEVICE — DRAPE IOBAN INCISE 23X17" 6650EZ

## (undated) DEVICE — ESU ELEC NDL 1" E1552

## (undated) DEVICE — SU MONOCRYL 3-0 PS-1 27" Y936H

## (undated) DEVICE — IMM KIT SHOULDER STABILIZATION 7210573

## (undated) DEVICE — NDL ECLIPSE 18GA 1.5"

## (undated) DEVICE — DRAPE U-DRAPE 1015NSD NON-STERILE

## (undated) DEVICE — SYR 10ML FINGER CONTROL W/O NDL 309695

## (undated) DEVICE — SOL HYDROGEN PEROXIDE 3% 4OZ BOTTLE F0010

## (undated) DEVICE — BRUSH SURGICAL SCRUB W/4% CHG SOL 25ML 371073

## (undated) DEVICE — ESU PENCIL W/HOLSTER E2350H

## (undated) DEVICE — SUCTION MANIFOLD NEPTUNE 2 SYS 4 PORT 0702-020-000

## (undated) DEVICE — SU ETHIBOND 2 V-37 4X30" MX69G

## (undated) DEVICE — BLADE SAW SAGITTAL STRK 20.7X85X0.89MM 2108-109-000S13

## (undated) DEVICE — LINEN DRAPE 54X72" 5467

## (undated) DEVICE — PAD ARMBOARD FOAM EGGCRATE COVIDEN 3114367

## (undated) DEVICE — HYDROGEN PEROXIDE 3% 16OZ D0012

## (undated) DEVICE — SU FIBERWIRE 2 38"  AR-7200

## (undated) DEVICE — ESU GROUND PAD ADULT W/CORD E7507

## (undated) DEVICE — SU SILK 2-0 TIE 12X30" A305H

## (undated) DEVICE — SU NDL MCGOWAN 1/2 1859-6D

## (undated) DEVICE — GLOVE PROTEXIS BLUE W/NEU-THERA 7.0  2D73EB70

## (undated) DEVICE — Device

## (undated) DEVICE — BRUSH SURGICAL SCRUB W/4% CHLORHEXIDINE GLUCONATE SOL 4458A

## (undated) DEVICE — LINEN ORTHO PACK 5446

## (undated) DEVICE — GLOVE PROTEXIS POWDER FREE SMT 8.5 2D72PT85X

## (undated) DEVICE — DRSG DRAIN 4X4" 7086

## (undated) DEVICE — MITT SURGICAL PREP HAIR REMOVER LATEX FREE SPM100

## (undated) DEVICE — SU VICRYL 0 CT-1 27" UND J260H

## (undated) DEVICE — DRAPE U-POUCH 34X29" 1067

## (undated) DEVICE — GLOVE PROTEXIS POWDER FREE 8.5 ORTHOPEDIC 2D73ET85

## (undated) DEVICE — IMM KIT SHOULDER TMAX MASK FACE 7210559

## (undated) DEVICE — PACK OPEN SHOULDER CUSTOM ASC

## (undated) DEVICE — SUCTION IRR SYSTEM W/O TIP INTERPULSE HANDPIECE 0210-100-000

## (undated) DEVICE — GLOVE PROTEXIS W/NEU-THERA 8.5  2D73TE85

## (undated) DEVICE — ESU CLEANER TIP 31142717

## (undated) DEVICE — SU ETHIBOND 1 CTX CR 8/18" CX30D

## (undated) DEVICE — GLOVE PROTEXIS W/NEU-THERA 6.5  2D73TE65

## (undated) DEVICE — DRSG AQUACEL AG 3.5X9.75" HYDROFIBER 412011

## (undated) DEVICE — SYR BULB IRRIG 50ML LATEX FREE 0035280

## (undated) DEVICE — DRSG TEGADERM 4X4 3/4" 1626W

## (undated) DEVICE — DRAPE STERI U 1015

## (undated) DEVICE — BLADE KNIFE SURG 10 371110

## (undated) DEVICE — DRAPE MAYO STAND 23X54 8337

## (undated) DEVICE — DRSG STERI STRIP 1/2X4" R1547

## (undated) DEVICE — DRSG TELFA 3X8" 1238

## (undated) DEVICE — SOL NACL 0.9% IRRIG 1000ML BOTTLE 2F7124

## (undated) DEVICE — SU VICRYL 2-0 CT-1 27" UND J259H

## (undated) DEVICE — TUBING SYSTEM ARTHREX PATIENT REDEUCE AR-6421

## (undated) DEVICE — SOL WATER IRRIG 1000ML BOTTLE 2F7114

## (undated) DEVICE — LIGHT HANDLE X2

## (undated) DEVICE — RESTRAINT LIMB HOLDER ANKLE/WRIST FOAM W/QUICK RELEASE 2533

## (undated) DEVICE — SUCTION MANIFOLD DORNOCH ULTRA CART UL-CL500

## (undated) DEVICE — BONE CLEANING TIP INTERPULSE  0210-010-000

## (undated) DEVICE — PACK SET-UP STD 9102

## (undated) DEVICE — SPONGE LAP 18X18" X8435

## (undated) DEVICE — SU MONOCRYL 3-0 PS-2 18" UND Y497G

## (undated) DEVICE — STRAP KNEE/BODY 31143004

## (undated) DEVICE — LINEN TOWEL PACK X5 5464

## (undated) RX ORDER — FENTANYL CITRATE 50 UG/ML
INJECTION, SOLUTION INTRAMUSCULAR; INTRAVENOUS
Status: DISPENSED
Start: 2017-10-25

## (undated) RX ORDER — DEXAMETHASONE SODIUM PHOSPHATE 4 MG/ML
INJECTION, SOLUTION INTRA-ARTICULAR; INTRALESIONAL; INTRAMUSCULAR; INTRAVENOUS; SOFT TISSUE
Status: DISPENSED
Start: 2017-10-02

## (undated) RX ORDER — PROPOFOL 10 MG/ML
INJECTION, EMULSION INTRAVENOUS
Status: DISPENSED
Start: 2017-10-02

## (undated) RX ORDER — FENTANYL CITRATE 50 UG/ML
INJECTION, SOLUTION INTRAMUSCULAR; INTRAVENOUS
Status: DISPENSED
Start: 2017-10-02

## (undated) RX ORDER — ONDANSETRON 2 MG/ML
INJECTION INTRAMUSCULAR; INTRAVENOUS
Status: DISPENSED
Start: 2017-10-02

## (undated) RX ORDER — GABAPENTIN 300 MG/1
CAPSULE ORAL
Status: DISPENSED
Start: 2017-10-02

## (undated) RX ORDER — KETOROLAC TROMETHAMINE 30 MG/ML
INJECTION, SOLUTION INTRAMUSCULAR; INTRAVENOUS
Status: DISPENSED
Start: 2017-10-02

## (undated) RX ORDER — SODIUM CHLORIDE 9 MG/ML
INJECTION, SOLUTION INTRAVENOUS
Status: DISPENSED
Start: 2017-10-25

## (undated) RX ORDER — LIDOCAINE HYDROCHLORIDE 20 MG/ML
INJECTION, SOLUTION EPIDURAL; INFILTRATION; INTRACAUDAL; PERINEURAL
Status: DISPENSED
Start: 2017-10-02

## (undated) RX ORDER — GABAPENTIN 300 MG/1
CAPSULE ORAL
Status: DISPENSED
Start: 2017-10-25

## (undated) RX ORDER — CEFAZOLIN SODIUM 1 G/3ML
INJECTION, POWDER, FOR SOLUTION INTRAMUSCULAR; INTRAVENOUS
Status: DISPENSED
Start: 2017-10-02

## (undated) RX ORDER — PROPOFOL 10 MG/ML
INJECTION, EMULSION INTRAVENOUS
Status: DISPENSED
Start: 2017-10-25

## (undated) RX ORDER — DEXAMETHASONE SODIUM PHOSPHATE 4 MG/ML
INJECTION, SOLUTION INTRA-ARTICULAR; INTRALESIONAL; INTRAMUSCULAR; INTRAVENOUS; SOFT TISSUE
Status: DISPENSED
Start: 2017-10-25

## (undated) RX ORDER — ONDANSETRON 2 MG/ML
INJECTION INTRAMUSCULAR; INTRAVENOUS
Status: DISPENSED
Start: 2017-10-25

## (undated) RX ORDER — LIDOCAINE HYDROCHLORIDE 20 MG/ML
INJECTION, SOLUTION EPIDURAL; INFILTRATION; INTRACAUDAL; PERINEURAL
Status: DISPENSED
Start: 2017-10-25

## (undated) RX ORDER — PHENYLEPHRINE HCL IN 0.9% NACL 1 MG/10 ML
SYRINGE (ML) INTRAVENOUS
Status: DISPENSED
Start: 2017-10-25

## (undated) RX ORDER — ACETAMINOPHEN 325 MG/1
TABLET ORAL
Status: DISPENSED
Start: 2017-10-25

## (undated) RX ORDER — ACETAMINOPHEN 325 MG/1
TABLET ORAL
Status: DISPENSED
Start: 2017-10-02

## (undated) RX ORDER — EPHEDRINE SULFATE 50 MG/ML
INJECTION, SOLUTION INTRAMUSCULAR; INTRAVENOUS; SUBCUTANEOUS
Status: DISPENSED
Start: 2017-10-25

## (undated) RX ORDER — CEFAZOLIN SODIUM 2 G/100ML
INJECTION, SOLUTION INTRAVENOUS
Status: DISPENSED
Start: 2017-10-25